# Patient Record
Sex: FEMALE | Race: BLACK OR AFRICAN AMERICAN | Employment: OTHER | URBAN - METROPOLITAN AREA
[De-identification: names, ages, dates, MRNs, and addresses within clinical notes are randomized per-mention and may not be internally consistent; named-entity substitution may affect disease eponyms.]

---

## 2019-10-08 ENCOUNTER — OFFICE VISIT (OUTPATIENT)
Dept: FAMILY MEDICINE CLINIC | Age: 64
End: 2019-10-08

## 2019-10-08 VITALS
HEIGHT: 61 IN | OXYGEN SATURATION: 94 % | HEART RATE: 83 BPM | TEMPERATURE: 96.8 F | WEIGHT: 98.6 LBS | RESPIRATION RATE: 16 BRPM | BODY MASS INDEX: 18.61 KG/M2 | SYSTOLIC BLOOD PRESSURE: 130 MMHG | DIASTOLIC BLOOD PRESSURE: 90 MMHG

## 2019-10-08 DIAGNOSIS — Z78.0 POST-MENOPAUSAL: ICD-10-CM

## 2019-10-08 DIAGNOSIS — F03.91 DEMENTIA WITH BEHAVIORAL DISTURBANCE, UNSPECIFIED DEMENTIA TYPE: ICD-10-CM

## 2019-10-08 DIAGNOSIS — Z12.39 SCREENING FOR BREAST CANCER: ICD-10-CM

## 2019-10-08 DIAGNOSIS — Z12.11 SCREENING FOR COLON CANCER: ICD-10-CM

## 2019-10-08 DIAGNOSIS — R03.0 ELEVATED BLOOD PRESSURE READING: Primary | ICD-10-CM

## 2019-10-08 DIAGNOSIS — Z78.9 DECREASED ACTIVITIES OF DAILY LIVING (ADL): ICD-10-CM

## 2019-10-08 DIAGNOSIS — G47.9 SLEEP TROUBLE: ICD-10-CM

## 2019-10-08 DIAGNOSIS — R32 URINARY INCONTINENCE, UNSPECIFIED TYPE: ICD-10-CM

## 2019-10-08 DIAGNOSIS — R63.0 POOR APPETITE: ICD-10-CM

## 2019-10-08 DIAGNOSIS — R26.89 BALANCE PROBLEM: ICD-10-CM

## 2019-10-08 DIAGNOSIS — Z11.59 NEED FOR HEPATITIS C SCREENING TEST: ICD-10-CM

## 2019-10-08 RX ORDER — HALOPERIDOL 0.5 MG/1
0.5 TABLET ORAL
Qty: 30 TAB | Refills: 1 | Status: SHIPPED | OUTPATIENT
Start: 2019-10-08 | End: 2019-12-16 | Stop reason: SDUPTHER

## 2019-10-08 NOTE — PROGRESS NOTES
1. Have you been to the ER, urgent care clinic since your last visit? Hospitalized since your last visit? No    2. Have you seen or consulted any other health care providers outside of the [address] [place] [address] since your last visit? Include any pap smears or colon screening. [facility]  LOV: 6/2018     Patient's niece states patient has dementia due to syphillis. Patient has not had a mammogram.  Patient refused flu vaccine.

## 2019-10-08 NOTE — PROGRESS NOTES
HISTORY OF PRESENT ILLNESS  Day Wade is a 75 y.o. female. HPI: new patient. Here with her niece. Said she has been not to doctor for long time as she did not want to. 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 Cognitive impairment. Her nephew has power of . He was not present today but per niece he will be here from next visit. Pt is not a good historian. Per niece needs help with ADL. Said unsteady gait. Fall risk. Also noted some behavior changes by nephew as hallucination. Not able to sleep for few nights straight. Sudden cry. Currently not on any medication. Not sure what exactly done in the past but all work up done years ago , almost 20 years ago at [facility] and since then has not seen any specialist seems like. No records noted in care everywhere. Will try to get a record release. Not following any specialist at this time. Not had any preventive work up done. Visit Vitals  /90 (BP 1 Location: Left arm, BP Patient Position: Sitting)   Pulse 83   Temp 96.8 °F (36 °C) (Temporal)   Resp 16   Ht 5' 0.5\" (1.537 m)   Wt 98 lb 9.6 oz (44.7 kg)   SpO2 94%   BMI 18.94 kg/m²     Review medication list, vitals, problem list,allergies. Agree to get mammogram and dexa scan. Agree to get done FIT test.   Refused all vaccination today . Per niece , next visit her brother will be here and he can give permission. Per niece bowel and urine incontinence. Denies any chest pain or breathing discomfort. No cold or cough. No leg swelling. No complains of abdominal discomfort. Poor appetite. ROS: pt has cognitive impairment from prior syphilis infection. Not able to get ROS. Physical Exam   Constitutional: No distress. Neck: No thyromegaly present. Cardiovascular: Normal heart sounds. Pulmonary/Chest: No respiratory distress. She has no wheezes. Abdominal: Soft. There is no tenderness. Musculoskeletal: She exhibits no edema.    Lymphadenopathy:     She has no cervical adenopathy. Neurological: She is alert. Psychiatric:   Sitting on a chair. No emotions. Looks comfortable while sitting. Clean clothes. Hair done. ASSESSMENT and PLAN    ICD-10-CM ICD-9-CM    1. Elevated blood pressure reading: low salt diet. Will observe for now. R03.0 796.2    2. Poor appetite: checking labs. R63.0 783.0 CBC W/O DIFF      METABOLIC PANEL, COMPREHENSIVE      TSH 3RD GENERATION   3. Screening for breast cancer Z12.39 V76.10 GARRICK MAMMO BI SCREENING INCL CAD   4. Post-menopausal:  Z78.0 V49.81 DEXA BONE DENSITY STUDY AXIAL   5. Screening for colon cancer Z12.11 V76.51 OCCULT BLOOD IMMUNOASSAY,DIAGNOSTIC   6. Need for hepatitis C screening test Z11.59 V73.89 HEPATITIS C AB   7. Sleep trouble: with behavior changes. Given haloperidol and also sent to specialist for further info. G47.9 780.50 haloperidol (HALDOL) 0.5 mg tablet      REFERRAL TO NEUROLOGY   8. Urinary incontinence, unspecified type: could be from cognitive dysfunction. Will observe. R32 788.30    9. Decreased activities of daily living (ADL): done home health. As poor gait . PT/ OT eval.  Z78.9 V49.89 REFERRAL TO HOME HEALTH      REFERRAL TO NEUROLOGY   10. Balance problem R26.89 781.99 REFERRAL TO [address] [place] [address]   11. Dementia with behavioral disturbance, unspecified dementia type [facility]): sent to neurology. Not had any time evaluation done. F03.91 294.21 [place]   Pt understood and agree with the plan   Review    Follow-up and Dispositions    · Return in about 1 month (around 11/8/2019).

## 2019-10-09 ENCOUNTER — HOME HEALTH ADMISSION (OUTPATIENT)
Dept: HOME HEALTH SERVICES | Facility: HOME HEALTH | Age: 64
End: 2019-10-09
Payer: MEDICAID

## 2019-10-10 ENCOUNTER — HOME CARE VISIT (OUTPATIENT)
Dept: HOME HEALTH SERVICES | Facility: HOME HEALTH | Age: 64
End: 2019-10-10

## 2019-10-14 ENCOUNTER — HOME CARE VISIT (OUTPATIENT)
Dept: SCHEDULING | Facility: HOME HEALTH | Age: 64
End: 2019-10-14
Payer: MEDICAID

## 2019-10-14 ENCOUNTER — HOME CARE VISIT (OUTPATIENT)
Dept: HOME HEALTH SERVICES | Facility: HOME HEALTH | Age: 64
End: 2019-10-14
Payer: MEDICAID

## 2019-10-14 ENCOUNTER — HOME CARE VISIT (OUTPATIENT)
Dept: HOME HEALTH SERVICES | Facility: HOME HEALTH | Age: 64
End: 2019-10-14

## 2019-10-14 VITALS
SYSTOLIC BLOOD PRESSURE: 140 MMHG | HEART RATE: 70 BPM | OXYGEN SATURATION: 97 % | DIASTOLIC BLOOD PRESSURE: 86 MMHG | RESPIRATION RATE: 16 BRPM | TEMPERATURE: 97.3 F

## 2019-10-14 PROCEDURE — G0151 HHCP-SERV OF PT,EA 15 MIN: HCPCS

## 2019-10-14 PROCEDURE — 400013 HH SOC

## 2019-10-15 ENCOUNTER — HOME CARE VISIT (OUTPATIENT)
Dept: HOME HEALTH SERVICES | Facility: HOME HEALTH | Age: 64
End: 2019-10-15
Payer: MEDICAID

## 2019-10-17 ENCOUNTER — HOME CARE VISIT (OUTPATIENT)
Dept: HOME HEALTH SERVICES | Facility: HOME HEALTH | Age: 64
End: 2019-10-17
Payer: MEDICAID

## 2019-10-17 ENCOUNTER — HOME CARE VISIT (OUTPATIENT)
Dept: SCHEDULING | Facility: HOME HEALTH | Age: 64
End: 2019-10-17
Payer: MEDICAID

## 2019-10-18 ENCOUNTER — HOME CARE VISIT (OUTPATIENT)
Dept: HOME HEALTH SERVICES | Facility: HOME HEALTH | Age: 64
End: 2019-10-18
Payer: MEDICAID

## 2019-10-18 PROCEDURE — G0152 HHCP-SERV OF OT,EA 15 MIN: HCPCS

## 2019-10-23 ENCOUNTER — HOME CARE VISIT (OUTPATIENT)
Dept: SCHEDULING | Facility: HOME HEALTH | Age: 64
End: 2019-10-23
Payer: MEDICAID

## 2019-10-23 ENCOUNTER — TELEPHONE (OUTPATIENT)
Dept: FAMILY MEDICINE CLINIC | Age: 64
End: 2019-10-23

## 2019-10-23 VITALS
TEMPERATURE: 97.3 F | HEART RATE: 71 BPM | OXYGEN SATURATION: 95 % | DIASTOLIC BLOOD PRESSURE: 96 MMHG | SYSTOLIC BLOOD PRESSURE: 136 MMHG

## 2019-10-23 PROCEDURE — G0157 HHC PT ASSISTANT EA 15: HCPCS

## 2019-10-24 ENCOUNTER — HOME CARE VISIT (OUTPATIENT)
Dept: HOME HEALTH SERVICES | Facility: HOME HEALTH | Age: 64
End: 2019-10-24
Payer: MEDICAID

## 2019-10-25 ENCOUNTER — HOME CARE VISIT (OUTPATIENT)
Dept: SCHEDULING | Facility: HOME HEALTH | Age: 64
End: 2019-10-25
Payer: MEDICAID

## 2019-10-28 ENCOUNTER — HOME CARE VISIT (OUTPATIENT)
Dept: SCHEDULING | Facility: HOME HEALTH | Age: 64
End: 2019-10-28
Payer: MEDICAID

## 2019-10-28 VITALS
OXYGEN SATURATION: 95 % | DIASTOLIC BLOOD PRESSURE: 85 MMHG | SYSTOLIC BLOOD PRESSURE: 132 MMHG | TEMPERATURE: 97.9 F | HEART RATE: 69 BPM

## 2019-10-28 PROCEDURE — G0157 HHC PT ASSISTANT EA 15: HCPCS

## 2019-11-01 ENCOUNTER — HOME CARE VISIT (OUTPATIENT)
Dept: SCHEDULING | Facility: HOME HEALTH | Age: 64
End: 2019-11-01
Payer: MEDICAID

## 2019-11-04 ENCOUNTER — HOME CARE VISIT (OUTPATIENT)
Dept: SCHEDULING | Facility: HOME HEALTH | Age: 64
End: 2019-11-04
Payer: MEDICAID

## 2019-11-04 PROCEDURE — G0157 HHC PT ASSISTANT EA 15: HCPCS

## 2019-11-05 VITALS
SYSTOLIC BLOOD PRESSURE: 143 MMHG | TEMPERATURE: 97.3 F | DIASTOLIC BLOOD PRESSURE: 94 MMHG | HEART RATE: 67 BPM | OXYGEN SATURATION: 97 %

## 2019-11-07 ENCOUNTER — HOME CARE VISIT (OUTPATIENT)
Dept: HOME HEALTH SERVICES | Facility: HOME HEALTH | Age: 64
End: 2019-11-07
Payer: MEDICAID

## 2019-11-09 ENCOUNTER — HOME CARE VISIT (OUTPATIENT)
Dept: SCHEDULING | Facility: HOME HEALTH | Age: 64
End: 2019-11-09
Payer: MEDICAID

## 2019-11-09 VITALS
TEMPERATURE: 96.9 F | OXYGEN SATURATION: 96 % | RESPIRATION RATE: 16 BRPM | DIASTOLIC BLOOD PRESSURE: 70 MMHG | SYSTOLIC BLOOD PRESSURE: 110 MMHG | HEART RATE: 69 BPM

## 2019-11-09 PROCEDURE — G0151 HHCP-SERV OF PT,EA 15 MIN: HCPCS

## 2019-11-19 ENCOUNTER — TELEPHONE (OUTPATIENT)
Dept: FAMILY MEDICINE CLINIC | Age: 64
End: 2019-11-19

## 2019-11-19 NOTE — TELEPHONE ENCOUNTER
Pt called to follow up on incontinence supplies request. Please call pt at your earliest convenience.

## 2019-11-21 NOTE — TELEPHONE ENCOUNTER
Pt called to follow up on incontinence supplies. They would like for you to call  [phone]. Please respond urgently pt is completely out.

## 2019-11-27 ENCOUNTER — TELEPHONE (OUTPATIENT)
Dept: FAMILY MEDICINE CLINIC | Age: 64
End: 2019-11-27

## 2019-11-27 NOTE — TELEPHONE ENCOUNTER
Pt's daughter states that pt was supposed to have a referral for the Infectious Disease and I didn't see one put in the system. She states that it is for the dementia due to syphilis infection. . pt is advised that DR Rosana Dwyer had not placed the order and that she is out of the office till Monday and will get the referral done then. Please advise.

## 2019-12-02 ENCOUNTER — TELEPHONE (OUTPATIENT)
Dept: FAMILY MEDICINE CLINIC | Age: 64
End: 2019-12-02

## 2019-12-02 DIAGNOSIS — Z86.19 H/O SYPHILIS: Primary | ICD-10-CM

## 2019-12-02 DIAGNOSIS — F09 COGNITIVE DYSFUNCTION: ICD-10-CM

## 2019-12-02 NOTE — TELEPHONE ENCOUNTER
Pt's niece states that pt had labs done at [facility] on 11/27/2019 and it showed that pt has Hyper thyroidism and would like to know if pt needs to be on medication. Advised that Dr Anish Stanton has not received the lab results yet but will call and advise when we get them. Please advise.

## 2019-12-06 ENCOUNTER — TELEPHONE (OUTPATIENT)
Dept: FAMILY MEDICINE CLINIC | Age: 64
End: 2019-12-06

## 2019-12-06 DIAGNOSIS — R79.89 ELEVATED TSH: Primary | ICD-10-CM

## 2019-12-06 RX ORDER — LEVOTHYROXINE SODIUM 50 UG/1
50 TABLET ORAL
Qty: 30 TAB | Refills: 1 | Status: SHIPPED | OUTPATIENT
Start: 2019-12-06 | End: 2019-12-16 | Stop reason: SDUPTHER

## 2019-12-06 NOTE — TELEPHONE ENCOUNTER
Starting synthroid. Repeat labs in 6 wks and also provide f/u appt in 6 wks and labs before an appt. Please see if she has been seen by neurology. I see that she has seen ID.    Thanks

## 2019-12-06 NOTE — TELEPHONE ENCOUNTER
Spoke with Amber Ryan (HIPAA verified-patient identifiers verified) to advise [facility] Infectious Disease needed patient to complete labs and forward neurology notes to their office in order complete appointment request. Kandi Padgett verbalized understanding and stated she will contact [facility] to request notes be sent to ID office. She was given the address, phone and fax number to [facility] ([address] [place] [address], [facility]; [place]: [phone] Fax: [phone]). Kandi Padgett was told I was unable to contact Mr. Yuliana Peña; his phone said it was unable to take call at this time. She requested lab orders from Dr. Mikey Daly be mailed to patient's address. Kandi Padgett was reminded patient has a follow-up appointment with Dr. Mikey Daly on 12/16/19 at 10:15A. She asked if Dr. Mikey Daly received thyroid lab results from [facility] for patient. I advised Dr. Mikey Daly did receive lab results and they are under review by physician; will discuss at follow-up visit. Kandi Padgett verbalized understanding.

## 2019-12-12 NOTE — TELEPHONE ENCOUNTER
Called and spoke with Gogo Dukes (on PHI) and she verbalized understanding of results reviewed and need to start medication. This was sent to pharmacy and also to recheck labs in 6 weeks. Patient has 12/16 appointment. Lab order to be given at that appointment.

## 2019-12-16 ENCOUNTER — OFFICE VISIT (OUTPATIENT)
Dept: FAMILY MEDICINE CLINIC | Age: 64
End: 2019-12-16

## 2019-12-16 VITALS
BODY MASS INDEX: 18.99 KG/M2 | OXYGEN SATURATION: 98 % | SYSTOLIC BLOOD PRESSURE: 136 MMHG | WEIGHT: 100.6 LBS | TEMPERATURE: 97.5 F | RESPIRATION RATE: 16 BRPM | DIASTOLIC BLOOD PRESSURE: 84 MMHG | HEART RATE: 82 BPM | HEIGHT: 61 IN

## 2019-12-16 DIAGNOSIS — Z86.19 H/O SYPHILIS: ICD-10-CM

## 2019-12-16 DIAGNOSIS — F03.91 DEMENTIA WITH BEHAVIORAL DISTURBANCE, UNSPECIFIED DEMENTIA TYPE: ICD-10-CM

## 2019-12-16 DIAGNOSIS — G47.9 SLEEP TROUBLE: Primary | ICD-10-CM

## 2019-12-16 DIAGNOSIS — Z78.9 DECREASED ACTIVITIES OF DAILY LIVING (ADL): ICD-10-CM

## 2019-12-16 DIAGNOSIS — H21.562 PUPILLARY ABNORMALITY OF LEFT EYE: ICD-10-CM

## 2019-12-16 DIAGNOSIS — E03.9 HYPOTHYROIDISM, UNSPECIFIED TYPE: ICD-10-CM

## 2019-12-16 RX ORDER — HALOPERIDOL 0.5 MG/1
0.5 TABLET ORAL
Qty: 30 TAB | Refills: 1 | Status: SHIPPED | OUTPATIENT
Start: 2019-12-16 | End: 2020-03-27 | Stop reason: SDUPTHER

## 2019-12-16 RX ORDER — LEVOTHYROXINE SODIUM 50 UG/1
50 TABLET ORAL
Qty: 30 TAB | Refills: 1 | Status: SHIPPED | OUTPATIENT
Start: 2019-12-16 | End: 2020-01-15

## 2019-12-16 NOTE — PATIENT INSTRUCTIONS
Dementia: Care Instructions  Your Care Instructions    Dementia is a loss of mental skills that affects your daily life. It is different than the occasional trouble with memory that is part of aging. You may find it hard to remember things that you feel you should be able to remember. Or you may feel that your mind is just not working as well as usual.  Finding out that you have dementia is a shock. You may be afraid and worried about how the condition will change your life. Although there is no cure at this time, medicine may slow memory loss and improve thinking for a while. Other medicines may be able to help you sleep or cope with depression and behavior changes. Dementia often gets worse slowly. But it can get worse quickly. As dementia gets worse, it may become harder to do common things that take planning, like making a list and going shopping. Over time, the disease may make it hard for you to take care of yourself. Some people with dementia need others to help care for them. Dementia is different for everyone. You may be able to function well for a long time. In the early stage of the condition, you can do things at home to make life easier and safer. You also can keep doing your hobbies and other activities. Many people find comfort in planning now for their future needs. Follow-up care is a key part of your treatment and safety. Be sure to make and go to all appointments, and call your doctor if you are having problems. It's also a good idea to know your test results and keep a list of the medicines you take. How can you care for yourself at home? · Take your medicines exactly as prescribed. Call your doctor if you think you are having a problem with your medicine. · Eat healthy foods. Eat lots of whole grains, fruits, and vegetables every day.  If you are not hungry, try snacks or nutritional drinks such as Boost, Ensure, or Sustacal.  · If you have problems sleeping:  ? Try not to nap too close to your bedtime. ? Exercise regularly. Walking is a good choice. ? Try a glass of warm milk or caffeine-free herbal tea before bed. · Do tasks and activities during the time of day when you feel your best. It may help to develop a daily routine. · Post labels, lists, and sticky notes to help you remember things. Write your activities on a calendar you can easily find. Put your clock where you can easily see it. · Stay active. Take walks in familiar places, or with friends or loved ones. Try to stay active mentally too. Read and work crossword puzzles if you enjoy these activities. · Do not drive unless you can pass an on-road driving test. If you are not sure if you are safe to drive, your state 's license bureau can test you. · Keep a cordless phone and a flashlight with new batteries by your bed. If possible, put a phone in each of the main rooms of your house, or carry a cell phone in case you fall and cannot reach a phone. Or, you can wear a device around your neck or wrist. You push a button that sends a signal for help. Acknowledge your emotions and plan for the future  · Talk openly and honestly with your doctor. · Let yourself grieve. It is common to feel angry, scared, frustrated, anxious, or depressed. · Get emotional support from family, friends, a support group, or a counselor experienced in working with people who have dementia. · Ask for help if you need it. · Tell your doctor how you feel. You may feel upset, angry, or worried at times. Many things can cause this, including poor sleep, medicine side effects, confusion, and pain. Your doctor may be able to help you. · Plan for the future. ? Talk to your family and doctor about preparing a living will and other important papers while you can make decisions. These papers tell your doctors how to care for you at the end of your life. ? Consider naming a person to make decisions about your care if you are not able to.   When should you call for help? Call 911 anytime you think you may need emergency care. For example, call if:    · You are lost and do not know whom to call.     · You are injured and do not know whom to call.    Call your doctor now or seek immediate medical care if:    · You are more confused or upset than usual.     · You feel like you could hurt yourself because your mind is not working well.   [facility] closely for changes in your health, and be sure to contact your doctor if you have any problems. Where can you learn more? Go to http://humaPPSsamreen.info/. Enter X590 in the search box to learn more about \"Dementia: Care Instructions. \"  Current as of: May 28, 2019  Content Version: 12.2  © 5771-8648 [employer]. Care instructions adapted under license by [employer] (which disclaims liability or warranty for this information). If you have questions about a medical condition or this instruction, always ask your healthcare professional. [place] [address] any warranty or liability for your use of this information. Preventing Falls: Care Instructions  Your Care Instructions    Getting around your home safely can be a challenge if you have injuries or health problems that make it easy for you to fall. Loose rugs and furniture in walkways are among the dangers for many older people who have problems walking or who have poor eyesight. People who have conditions such as arthritis, osteoporosis, or dementia also have to be careful not to fall. You can make your home safer with a few simple measures. Follow-up care is a key part of your treatment and safety. Be sure to make and go to all appointments, and call your doctor if you are having problems. It's also a good idea to know your test results and keep a list of the medicines you take. How can you care for yourself at home? Taking care of yourself  · You may get dizzy if you do not drink enough water. To prevent dehydration, drink plenty of fluids, enough so that your urine is light yellow or clear like water. Choose water and other caffeine-free clear liquids. If you have kidney, heart, or liver disease and have to limit fluids, talk with your doctor before you increase the amount of fluids you drink. · Exercise regularly to improve your strength, muscle tone, and balance. Walk if you can. Swimming may be a good choice if you cannot walk easily. · Have your vision and hearing checked each year or any time you notice a change. If you have trouble seeing and hearing, you might not be able to avoid objects and could lose your balance. · Know the side effects of the medicines you take. Ask your doctor or pharmacist whether the medicines you take can affect your balance. Sleeping pills or sedatives can affect your balance. · Limit the amount of alcohol you drink. Alcohol can impair your balance and other senses. · Ask your doctor whether calluses or corns on your feet need to be removed. If you wear loose-fitting shoes because of calluses or corns, you can lose your balance and fall. · Talk to your doctor if you have numbness in your feet. Preventing falls at home  · Remove raised doorway thresholds, throw rugs, and clutter. Repair loose carpet or raised areas in the floor. · Move furniture and electrical cords to keep them out of walking paths. · Use nonskid floor wax, and wipe up spills right away, especially on ceramic tile floors. · If you use a walker or cane, put rubber tips on it. If you use crutches, clean the bottoms of them regularly with an abrasive pad, such as steel wool. · Keep your house well lit, especially [place], and outside walkways. Use night-lights in areas such as hallways and bathrooms.  Add extra light switches or use remote switches (such as switches that go on or off when you clap your hands) to make it easier to turn lights on if you have to get up during the night.  · Install sturdy handrails on stairways. · Move items in your cabinets so that the things you use a lot are on the lower shelves (about waist level). · Keep a cordless phone and a flashlight with new batteries by your bed. If possible, put a phone in each of the main rooms of your house, or carry a cell phone in case you fall and cannot reach a phone. Or, you can wear a device around your neck or wrist. You push a button that sends a signal for help. · Wear low-heeled shoes that fit well and give your feet good support. Use footwear with nonskid soles. Check the heels and soles of your shoes for wear. Repair or replace worn heels or soles. · Do not wear socks without shoes on wood floors. · Walk on the grass when the sidewalks are slippery. If you live in an area that gets snow and ice in the winter, sprinkle salt on slippery steps and sidewalks. Preventing falls in the bath  · Install grab bars and nonskid mats inside and outside your shower or tub and near the toilet and sinks. · Use shower chairs and bath benches. · Use a hand-held shower head that will allow you to sit while showering. · Get into a tub or shower by putting the weaker leg in first. Get out of a tub or shower with your strong side first.  · Repair loose toilet seats and consider installing a raised toilet seat to make getting on and off the toilet easier. · Keep your bathroom door unlocked while you are in the shower. Where can you learn more? Go to http://huma-samreen.info/. Enter 0476 79 69 [id number] in the search box to learn more about \"Preventing Falls: Care Instructions. \"  Current as of: November 7, 2018  Content Version: 12.2  © 8443-5899 [employer]. Care instructions adapted under license by [employer] (which disclaims liability or warranty for this information).  If you have questions about a medical condition or this instruction, always ask your healthcare professional. [employer] disclaims any warranty or liability for your use of this information. Eating Healthy Foods: Care Instructions  Your Care Instructions    Eating healthy foods can help lower your risk for disease. Healthy food gives you energy and keeps your heart strong, your brain active, your muscles working, and your bones strong. A healthy diet includes a variety of foods from the basic food groups: grains, vegetables, fruits, milk and milk products, and meat and beans. Some people may eat more of their favorite foods from only one food group and, as a result, miss getting the nutrients they need. So, it is important to pay attention not only to what you eat but also to what you are missing from your diet. You can eat a healthy, balanced diet by making a few small changes. Follow-up care is a key part of your treatment and safety. Be sure to make and go to all appointments, and call your doctor if you are having problems. It's also a good idea to know your test results and keep a list of the medicines you take. How can you care for yourself at home? Look at what you eat  · Keep a food diary for a week or two and record everything you eat or drink. Track the number of servings you eat from each food group. · For a balanced diet every day, eat a variety of:  ? 6 or more ounce-equivalents of grains, such as cereals, breads, crackers, rice, or pasta, every day. An ounce-equivalent is 1 slice of bread, 1 cup of ready-to-eat cereal, or ½ cup of cooked rice, cooked pasta, or cooked cereal.  ? 2½ cups of vegetables, especially:  § Dark-green vegetables such as broccoli and spinach. § Orange vegetables such as carrots and sweet potatoes. § Dry beans (such as wilson and kidney beans) and peas (such as lentils). ? 2 cups of fresh, frozen, or canned fruit. A small apple or 1 banana or orange equals 1 cup. ? 3 cups of nonfat or low-fat milk, yogurt, or other milk products.   ? 5½ ounces of meat and beans, such as chicken, fish, lean meat, beans, nuts, and seeds. One egg, 1 tablespoon of peanut butter, ½ ounce nuts or seeds, or ¼ cup of cooked beans equals 1 ounce of meat. · Learn how to read food labels for serving sizes and ingredients. Fast-food and convenience-food meals often contain few or no fruits or vegetables. Make sure you eat some fruits and vegetables to make the meal more nutritious. · Look at your food diary. For each food group, add up what you have eaten and then divide the total by the number of days. This will give you an idea of how much you are eating from each food group. See if you can find some ways to change your diet to make it more healthy. Start small  · Do not try to make dramatic changes to your diet all at once. You might feel that you are missing out on your favorite foods and then be more likely to fail. · Start slowly, and gradually change your habits. Try some of the following:  ? Use whole wheat bread instead of white bread. ? Use nonfat or low-fat milk instead of whole milk. ? Eat brown rice instead of white rice, and eat whole wheat pasta instead of white-flour pasta. ? Try low-fat cheeses and low-fat yogurt. ? Add more fruits and vegetables to meals and have them for snacks. ? Add lettuce, tomato, cucumber, and onion to sandwiches. ? Add fruit to yogurt and cereal.  Enjoy food  · You can still eat your favorite foods. You just may need to eat less of them. If your favorite foods are high in fat, salt, and sugar, limit how often you eat them, but do not cut them out entirely. · Eat a wide variety of foods. Make healthy choices when eating out  · The type of restaurant you choose can help you make healthy choices. Even fast-food chains are now offering more low-fat or healthier choices on the menu. · Choose smaller portions, or take half of your meal home. · When eating out, try:  ?  A veggie pizza with a whole wheat crust or grilled chicken (instead of sausage or pepperoni). ? Pasta with roasted vegetables, grilled chicken, or marinara sauce instead of cream sauce. ? A vegetable wrap or grilled chicken wrap. ? Broiled or poached food instead of fried or breaded items. Make healthy choices easy  · Buy packaged, prewashed, ready-to-eat fresh vegetables and fruits, such as baby carrots, salad mixes, and chopped or shredded broccoli and cauliflower. · Buy packaged, presliced fruits, such as melon or pineapple. · Choose 100% fruit or vegetable juice instead of soda. Limit juice intake to 4 to 6 oz (½ to ¾ cup) a day. · Blend low-fat yogurt, fruit juice, and canned or frozen fruit to make a smoothie for breakfast or a snack. Where can you learn more? Go to http://huma-samreen.info/. Enter T756 in the search box to learn more about \"Eating Healthy Foods: Care Instructions. \"  Current as of: November 7, 2018  Content Version: 12.2  © 7727-0587 [employer]. Care instructions adapted under license by [employer] (which disclaims liability or warranty for this information). If you have questions about a medical condition or this instruction, always ask your healthcare professional. [address] any warranty or liability for your use of this information.

## 2019-12-16 NOTE — PROGRESS NOTES
1. Have you been to the ER, urgent care clinic since your last visit? Hospitalized since your last visit? No    2. Have you seen or consulted any other health care providers outside of the [address] [place] [address] since your last visit? Include any pap smears or colon screening. No    Chief Complaint   Patient presents with    Elevated Blood Pressure    Decreased Appetite    Sleep Problem    Dementia    Other     balance    Eye Problem     patient wants doctor to examine left eye - some irritation as per patient     Patient refused flu vaccine.

## 2019-12-17 ENCOUNTER — TELEPHONE (OUTPATIENT)
Dept: FAMILY MEDICINE CLINIC | Age: 64
End: 2019-12-17

## 2019-12-17 NOTE — PROGRESS NOTES
HISTORY OF PRESENT ILLNESS  Darian Vergara is a 75 y.o. female. HPI:Here with her care giver. 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 She was seen by ID at [facility] and labs been ordered. She has not done labs ordered by me last visit. Reprinted orders and advised her to do it. Also on haloperidol for behavior changes. For now following [facility] as labs from them noted elevated TSh and she was started on synthroid. Advised her to repeat thyroid function in couple of months. Mean time be complaint with taking medication. Also home health has released her for PT/ Ot. Discussed her care taker about fall prevention. Use support to walk and encouraged pt to do so. Noted on exam left eye pupil discoloration compare to rt eye. She is having vision changes as well. She has been following neurology. Will obtain their notes. If not mentioned will consider do addendum in her care with neurology  No ext weakness, has some incontinent for urine , could be part of her dementia. Visit Vitals  /84 (BP 1 Location: Left arm, BP Patient Position: Sitting)   Pulse 82   Temp 97.5 °F (36.4 °C) (Oral)   Resp 16   Ht 5' 0.5\" (1.537 m)   Wt 100 lb 9.6 oz (45.6 kg)   SpO2 98%   BMI 19.32 kg/m²     Review medication list, vitals, problem list,allergies. ROS: see HPI     Physical Exam  Constitutional:       General: She is not in acute distress. Eyes:      Comments: Left eye : grey color pupil   Rt eye : brownish black pupil  React to light equally    Cardiovascular:      Rate and Rhythm: Normal rate and regular rhythm. Pulses: Normal pulses. Heart sounds: Normal heart sounds. Pulmonary:      Effort: Pulmonary effort is normal.      Breath sounds: Normal breath sounds. Abdominal:      General: Bowel sounds are normal.      Palpations: Abdomen is soft. Tenderness: There is no tenderness. Musculoskeletal:         General: No swelling.    Neurological:      Mental Status: She is alert and oriented to person, place, and time. Psychiatric:         Behavior: Behavior normal.         ASSESSMENT and PLAN    ICD-10-CM ICD-9-CM    1. Sleep trouble; stable with halopredol. G47.9 780.50 haloperidol (HALDOL) 0.5 mg tablet   2. Dementia with behavioral disturbance, unspecified dementia type ([facility] F03.91 294.21    3. H/O syphilis: established care with ID.  Z86.19 V12.09     seen ID. had congnitive dysfunction from syphilis. 4. Hypothyroidism, unspecified type: started on synthroid. Now advised to do labs in 2 months. E03.9 244.9    5. Decreased activities of daily living (ADL) Z78.9 V49.89    6. Pupillary abnormality of left eye: will do addendum at neurology . H21.887 364.75     grey color . no vision changes    Pt understood and agree with the plan   Review    Follow-up and Dispositions    · Return in about 3 months (around 3/16/2020).

## 2019-12-17 NOTE — TELEPHONE ENCOUNTER
Gavi Butler called and stated the medication haloperidol (HALDOL) 0.5 mg tablet isn't working. Please call pt at your earliest convenience.

## 2019-12-18 ENCOUNTER — TELEPHONE (OUTPATIENT)
Dept: FAMILY MEDICINE CLINIC | Age: 64
End: 2019-12-18

## 2019-12-18 NOTE — TELEPHONE ENCOUNTER
Spoke with patient' granddaughter and she indicated that the patient is not sleeping. Patient is being followed by [facility]. I advised Ms. Antonio to contact their office for recommendations. She verbalized understanding and will call us back if needed.

## 2019-12-18 NOTE — TELEPHONE ENCOUNTER
Spoke again with patient's niece Ms. Alvarez and she indicated that she called over to the [facility] and her doctor is not in the office and would like this to be addressed by her PCP as we last Rxd this for her    Patient has not been sleeping and hasn't slept in approximately 4 days. Niece is worried.

## 2019-12-19 NOTE — TELEPHONE ENCOUNTER
She is on haloperidol and refill was given during last visit. Per anna it was given for sleep. There should be covering provider at Neurology office as she is very new and specialist office is managing her dementia. It can be probably due to dementia changes. Please talk to her neurology office and see if we can ask them to help her with their recommendations. If needed please speak with Dr. Eric Mosqueda and see if needed to be seen again? ?

## 2019-12-19 NOTE — TELEPHONE ENCOUNTER
Called and spoke with  Kandis Tam at [facility] and she indicated that also spoke with the patient's niece yesterday and she was Rx'd Remeron 7.5mg QHS yesterday.

## 2020-01-07 ENCOUNTER — TELEPHONE (OUTPATIENT)
Dept: FAMILY MEDICINE CLINIC | Age: 65
End: 2020-01-07

## 2020-01-07 NOTE — TELEPHONE ENCOUNTER
Pt's niece is requesting to get more hours for home care to help out. She states that the pt has left side weakness and the cataracts , dementia with  The behavioral disturbances. If an order can be sent in to the agency requesting more hours. It looks like the  Pt uses [facility]. Please advise.

## 2020-01-08 NOTE — TELEPHONE ENCOUNTER
Please let her know that it is up to insurance company to decide how many hours she gets and rest of the time they need to have a personal AID at their own cost. She can speak with her insurance  and see if they can provide more help?? Or another evaluation? ?

## 2020-01-08 NOTE — TELEPHONE ENCOUNTER
Patient's niece called again to give us the name of the home health agency that has been coming tot he home. I asked who has been signing home health orders because I do not see any signed orders in our chart. She states she thinks that her prior doctor was signing. I advised we would need to see orders from home health in order to know what is going on.

## 2020-01-09 NOTE — TELEPHONE ENCOUNTER
Returned call to [facility] (HIPAA verified), but no answer and unable to leave a voice message due to a full mailbox.

## 2020-01-10 LAB
ALBUMIN SERPL-MCNC: 3.7 G/DL (ref 3.6–4.8)
ALBUMIN/GLOB SERPL: 1.2 {RATIO} (ref 1.2–2.2)
ALP SERPL-CCNC: 100 IU/L (ref 39–117)
ALT SERPL-CCNC: 11 IU/L (ref 0–32)
AST SERPL-CCNC: 17 IU/L (ref 0–40)
BILIRUB SERPL-MCNC: <0.2 MG/DL (ref 0–1.2)
BUN SERPL-MCNC: 10 MG/DL (ref 8–27)
BUN/CREAT SERPL: 14 (ref 12–28)
CALCIUM SERPL-MCNC: 8.8 MG/DL (ref 8.7–10.3)
CHLORIDE SERPL-SCNC: 107 MMOL/L (ref 96–106)
CO2 SERPL-SCNC: 24 MMOL/L (ref 20–29)
CREAT SERPL-MCNC: 0.7 MG/DL (ref 0.57–1)
ERYTHROCYTE [DISTWIDTH] IN BLOOD BY AUTOMATED COUNT: 13.6 % (ref 11.7–15.4)
GLOBULIN SER CALC-MCNC: 3.2 G/DL (ref 1.5–4.5)
GLUCOSE SERPL-MCNC: 79 MG/DL (ref 65–99)
HCT VFR BLD AUTO: 40.1 % (ref 34–46.6)
HCV AB S/CO SERPL IA: <0.1 S/CO RATIO (ref 0–0.9)
HGB BLD-MCNC: 12.9 G/DL (ref 11.1–15.9)
MCH RBC QN AUTO: 27.8 PG (ref 26.6–33)
MCHC RBC AUTO-ENTMCNC: 32.2 G/DL (ref 31.5–35.7)
MCV RBC AUTO: 86 FL (ref 79–97)
PLATELET # BLD AUTO: 229 X10E3/UL (ref 150–450)
POTASSIUM SERPL-SCNC: 3.7 MMOL/L (ref 3.5–5.2)
PROT SERPL-MCNC: 6.9 G/DL (ref 6–8.5)
RBC # BLD AUTO: 4.64 X10E6/UL (ref 3.77–5.28)
SODIUM SERPL-SCNC: 146 MMOL/L (ref 134–144)
TSH SERPL DL<=0.005 MIU/L-ACNC: 13.94 UIU/ML (ref 0.45–4.5)
WBC # BLD AUTO: 5.2 X10E3/UL (ref 3.4–10.8)

## 2020-01-15 DIAGNOSIS — E03.9 HYPOTHYROIDISM, UNSPECIFIED TYPE: Primary | ICD-10-CM

## 2020-01-15 DIAGNOSIS — E87.0 HYPERNATREMIA: ICD-10-CM

## 2020-01-15 RX ORDER — LEVOTHYROXINE SODIUM 75 UG/1
75 TABLET ORAL
Qty: 30 TAB | Refills: 1 | Status: SHIPPED | OUTPATIENT
Start: 2020-01-15 | End: 2020-03-16 | Stop reason: SDUPTHER

## 2020-01-15 NOTE — PROGRESS NOTES
High sodium and high tsh. Will go up on the dose and recheck labs in 2 months. Also elevate sodium. Drink more fluid. Will recheck labs along with thyroid function.

## 2020-01-17 NOTE — TELEPHONE ENCOUNTER
Sarah Ponce (HIPAA verified-all identifiers verified) called to advise patient is receiving home health care services. Florencio López stated the patient is telling home health nurse that she can do daily activities on her own when she is not able to care for herself. She is concerned home health nurse does not realize patient has dementia. Florencio López did not know name of health care agency patient is using; stated her brother has the information. Florencio López was asked to have home health agency fax a record request to [facility], [phone]. We will send referral order and office notes to agency. She was asked to contact home health agency to advise them of her concerns, as well. Florencio López verbalized understanding. Rae Hutson was advised Medicaid determines amount of home health care hours and services patient can receive. If any additional home health hours are needed for patient's care, it will be an out-of-pocket expense. She was asked to contact patient's  to discuss care hours further. Florencio Douglasjack verbalized understanding.

## 2020-01-17 NOTE — TELEPHONE ENCOUNTER
Attempted to contact patient's niece, Cassie Garcia, on mobile phone but no answer and unable to leave a voice message due to a full voice mailbox.

## 2020-01-17 NOTE — PROGRESS NOTES
Tried to reach patient but received message that call cannot be completed as there are restrictions on this line.

## 2020-01-20 NOTE — PROGRESS NOTES
Tried to reach patient regarding results and received message that call cannot be completed as there are restrictions on this line. Will send GIT letter.

## 2020-01-21 ENCOUNTER — PATIENT OUTREACH (OUTPATIENT)
Dept: FAMILY MEDICINE CLINIC | Age: 65
End: 2020-01-21

## 2020-01-24 NOTE — TELEPHONE ENCOUNTER
Spoke with Queta Esparza and she indicated that her brother is taking care of the [address] [place] [address] Taz Machado. She is still receiving home care services but no respite care services as she is requesting. Advised if there is anything that they need assistance with to please give us a call.

## 2020-03-16 ENCOUNTER — OFFICE VISIT (OUTPATIENT)
Dept: FAMILY MEDICINE CLINIC | Age: 65
End: 2020-03-16

## 2020-03-16 VITALS
OXYGEN SATURATION: 95 % | DIASTOLIC BLOOD PRESSURE: 80 MMHG | BODY MASS INDEX: 18.96 KG/M2 | HEART RATE: 62 BPM | WEIGHT: 100.4 LBS | TEMPERATURE: 97.6 F | RESPIRATION RATE: 16 BRPM | HEIGHT: 61 IN | SYSTOLIC BLOOD PRESSURE: 110 MMHG

## 2020-03-16 DIAGNOSIS — R63.0 POOR APPETITE: ICD-10-CM

## 2020-03-16 DIAGNOSIS — G47.9 SLEEP TROUBLE: ICD-10-CM

## 2020-03-16 DIAGNOSIS — E03.9 HYPOTHYROIDISM, UNSPECIFIED TYPE: ICD-10-CM

## 2020-03-16 DIAGNOSIS — F03.91 DEMENTIA WITH BEHAVIORAL DISTURBANCE, UNSPECIFIED DEMENTIA TYPE: Primary | ICD-10-CM

## 2020-03-16 DIAGNOSIS — H21.562 PUPILLARY ABNORMALITY OF LEFT EYE: ICD-10-CM

## 2020-03-16 DIAGNOSIS — Z86.19 H/O SYPHILIS: ICD-10-CM

## 2020-03-16 DIAGNOSIS — Z78.9 DECREASED ACTIVITIES OF DAILY LIVING (ADL): ICD-10-CM

## 2020-03-16 RX ORDER — LEVOTHYROXINE SODIUM 75 UG/1
75 TABLET ORAL
Qty: 30 TAB | Refills: 1 | Status: SHIPPED | OUTPATIENT
Start: 2020-03-16 | End: 2022-09-28 | Stop reason: ALTCHOICE

## 2020-03-16 RX ORDER — PREDNISOLONE ACETATE 10 MG/ML
SUSPENSION/ DROPS OPHTHALMIC
COMMUNITY
Start: 2020-02-23 | End: 2021-05-21

## 2020-03-16 RX ORDER — BISMUTH SUBSALICYLATE 262 MG
1 TABLET,CHEWABLE ORAL DAILY
Qty: 90 TAB | Refills: 0 | Status: SHIPPED | OUTPATIENT
Start: 2020-03-16 | End: 2022-09-28 | Stop reason: SDUPTHER

## 2020-03-16 RX ORDER — TRAZODONE HYDROCHLORIDE 50 MG/1
50 TABLET ORAL
COMMUNITY
End: 2021-05-21

## 2020-03-16 RX ORDER — HALOPERIDOL 0.5 MG/1
0.5 TABLET ORAL
Qty: 30 TAB | Refills: 1 | Status: CANCELLED | OUTPATIENT
Start: 2020-03-16

## 2020-03-16 NOTE — PROGRESS NOTES
1. Have you been to the ER, urgent care clinic since your last visit? Hospitalized since your last visit? No    2. Have you seen or consulted any other health care providers outside of the [address] [place] [address] since your last visit? Include any pap smears or colon screening. [facility] Dr. Travis [facility]) for cataract removal left eye LOV: 2/2020. Chief Complaint   Patient presents with    Sleep Problem    Dementia    Thyroid Problem    Other     decreased ADLs    Eye Problem     pupillary abnormality of left eye    Decreased Appetite     off/on x 5 months - requests Rx to help with decreased appetite.         Mammogram - not completed

## 2020-03-16 NOTE — PROGRESS NOTES
HISTORY OF PRESENT ILLNESS  Izaiah Lockhart is a 75 y.o. female. HPI: here for follow up. Her niece was accompanied with her. H/o syphilis and dementia related to it. Following neurology and ID at [facility]. Will be obtaining their notes and advised to follow their recommendations. She had MRI head and CSF study done. Per niece recommended treatment with IV antibiotic. Advised to follow their instructions and contact their office to have f/u on set up for antibiotic. H/o elevated TSH. She was started on synthroid. For now advised to repeat labs. She has poor appetite. Could be from dementia and could be from hypothyroidism as well. No weight changes. For now advised multivitamin daily. encourage her to drink more fluid and healthy diet. She is taking haldol which is helping her for behavior. Still struggle with sleep. Sitting comfortable on a wheel chair. Not in an acute distress. No other  Concern with care taker or pt. No chest pain or sob. No palpitation. No urinary or bowel complains. Visit Vitals  /80 (BP 1 Location: Left arm, BP Patient Position: Sitting)   Pulse 62   Temp 97.6 °F (36.4 °C) (Axillary)   Resp 16   Ht 5' 0.5\" (1.537 m)   Wt 100 lb 6.4 oz (45.5 kg)   SpO2 95%   BMI 19.29 kg/m²     Review medication list, vitals, problem list,allergies. ROS: see HPI     Physical Exam  Cardiovascular:      Rate and Rhythm: Normal rate and regular rhythm. Pulmonary:      Effort: Pulmonary effort is normal. No respiratory distress. Abdominal:      Palpations: Abdomen is soft. Tenderness: There is no abdominal tenderness. Musculoskeletal:         General: No swelling. Neurological:      Mental Status: She is alert. Psychiatric:         Behavior: Behavior normal.         ASSESSMENT and PLAN    ICD-10-CM ICD-9-CM    1. Dementia with behavioral disturbance, unspecified dementia type ([facility]): due to syphilis. Following neurology and ID> plan for IV antibiotic. F03.91 294.21    2. Sleep trouble: following neurology. Trazodone helping very little. Will observe and follow specialist recommendations. G47.9 780.50     started on trazodone     3. Hypothyroidism, unspecified type: complaint with medication. Repeat labs. Appetite loss could be multiple reason. For now multivitamin. E03.9 244.9 levothyroxine (SYNTHROID) 75 mcg tablet   4. H/O syphilis: following ID. See above  Z86.19 V12.09    5. Pupillary abnormality of left eye: no neurology notes available. For now will obtain notes and follow neurology recommendations. H21.562 364.75    6. Decreased activities of daily living (ADL): home health helping. Z78.9 V49.89    7. Poor appetite: see above/ HPI  R63.0 783.0 multivitamin (ONE A DAY) tablet   Pt understood and agree with the plan   Review    Follow-up and Dispositions    · Return in about 2 months (around 5/16/2020).

## 2020-03-16 NOTE — PATIENT INSTRUCTIONS
Syphilis: Care Instructions  Your Care Instructions  Syphilis is an infection caused by bacteria. It's usually spread through sex. It is one of several types of sexually transmitted infections (STIs). The first symptom is usually a painless, red sore on the genitals, rectal area, or mouth. This type of sore is called a chancre (say \"SHANK-er\"). Later, you may get other symptoms. These include a rash, a fever, and swollen lymph nodes. Your hair may start to fall out. Or you may feel like you have the flu. Sometimes these symptoms go away on their own. But this doesn't mean that the infection is gone. If you don't treat syphilis with antibiotics, the infection can spread in your body. You can also spread it to others. Antibiotics can cure syphilis and prevent more serious complications. Both you and your sex partner or partners need antibiotic treatment. This is to prevent you from passing the infection back and forth or to other sex partners. During the first 24 hours of treatment, you may have a fever, a headache, and muscle aches. After treatment, you will get blood tests to make sure you don't have any more bacteria in your body. You may also want to be tested for other STIs. Follow-up care is a key part of your treatment and safety. Be sure to make and go to all appointments, and call your doctor if you are having problems. It's also a good idea to know your test results and keep a list of the medicines you take. How can you care for yourself at home? · Your doctor probably gave you a shot of antibiotics. If you've had syphilis for a while, you may need 2 more shots. It's very important to get all the recommended shots. · If your doctor prescribed antibiotic pills, take them as directed. Do not stop taking them just because you feel better. You need to take the full course of antibiotics. · Do not have sexual contact with anyone while you are being treated.  After treatment, wait at least 7 days and until all of your sores are healed before you have any sexual contact. Even if you use a condom, you and your partner may pass the infection back and forth. · Wash your hands if you touch an infected area. This will help prevent spreading the infection to other parts of your body or to other people. · Tell your sex partner or partners that you have syphilis. They should get treatment even if they don't have symptoms. To prevent syphilis in the future  · Use latex condoms every time you have sex. Use them from the start to the end of sexual contact. · Talk to your partner before you have sex. Find out if he or she has or is at risk for syphilis or any other STI. Remember that a person without symptoms may still be able to spread an STI. · Do not have sex or any type of sexual contact if you are being treated for syphilis or any other STI. · Do not have sex with anyone who has symptoms of an STI. These include sores on the genitals or mouth. · Having one sex partner (who does not have STIs and does not have sex with anyone else) is a good way to avoid STIs. When should you call for help? Watch closely for changes in your health, and be sure to contact your doctor if:    · You do not get better as expected.     · Your symptoms continue or come back after treatment.     · You develop new symptoms, such as a fever. Where can you learn more? Go to http://huma-samreen.info/  Enter Z000 in the search box to learn more about \"Syphilis: Care Instructions. \"  Current as of: July 7, 2019Content Version: 12.4  © 8718-6372 Healthwise, Incorporated. Care instructions adapted under license by [employer] (which disclaims liability or warranty for this information). If you have questions about a medical condition or this instruction, always ask your healthcare professional. [address] any warranty or liability for your use of this information.          Dementia: Care Instructions  Your Care Instructions    Dementia is a loss of mental skills that affects your daily life. It is different than the occasional trouble with memory that is part of aging. You may find it hard to remember things that you feel you should be able to remember. Or you may feel that your mind is just not working as well as usual.  Finding out that you have dementia is a shock. You may be afraid and worried about how the condition will change your life. Although there is no cure at this time, medicine may slow memory loss and improve thinking for a while. Other medicines may be able to help you sleep or cope with depression and behavior changes. Dementia often gets worse slowly. But it can get worse quickly. As dementia gets worse, it may become harder to do common things that take planning, like making a list and going shopping. Over time, the disease may make it hard for you to take care of yourself. Some people with dementia need others to help care for them. Dementia is different for everyone. You may be able to function well for a long time. In the early stage of the condition, you can do things at home to make life easier and safer. You also can keep doing your hobbies and other activities. Many people find comfort in planning now for their future needs. Follow-up care is a key part of your treatment and safety. Be sure to make and go to all appointments, and call your doctor if you are having problems. It's also a good idea to know your test results and keep a list of the medicines you take. How can you care for yourself at home? · Take your medicines exactly as prescribed. Call your doctor if you think you are having a problem with your medicine. · Eat healthy foods. Eat lots of whole grains, fruits, and vegetables every day.  If you are not hungry, try snacks or nutritional drinks such as Boost, Ensure, or Sustacal.  · If you have problems sleeping:  ? Try not to nap too close to your bedtime. ? Exercise regularly. Walking is a good choice. ? Try a glass of warm milk or caffeine-free herbal tea before bed. · Do tasks and activities during the time of day when you feel your best. It may help to develop a daily routine. · Post labels, lists, and sticky notes to help you remember things. Write your activities on a calendar you can easily find. Put your clock where you can easily see it. · Stay active. Take walks in familiar places, or with friends or loved ones. Try to stay active mentally too. Read and work crossword puzzles if you enjoy these activities. · Do not drive unless you can pass an on-road driving test. If you are not sure if you are safe to drive, your state 's license bureau can test you. · Keep a cordless phone and a flashlight with new batteries by your bed. If possible, put a phone in each of the main rooms of your house, or carry a cell phone in case you fall and cannot reach a phone. Or, you can wear a device around your neck or wrist. You push a button that sends a signal for help. Acknowledge your emotions and plan for the future  · Talk openly and honestly with your doctor. · Let yourself grieve. It is common to feel angry, scared, frustrated, anxious, or depressed. · Get emotional support from family, friends, a support group, or a counselor experienced in working with people who have dementia. · Ask for help if you need it. · Tell your doctor how you feel. You may feel upset, angry, or worried at times. Many things can cause this, including poor sleep, medicine side effects, confusion, and pain. Your doctor may be able to help you. · Plan for the future. ? Talk to your family and doctor about preparing a living will and other important papers while you can make decisions. These papers tell your doctors how to care for you at the end of your life. ? Consider naming a person to make decisions about your care if you are not able to.   When should you call for help?  Call 911 anytime you think you may need emergency care. For example, call if:    · You are lost and do not know whom to call.     · You are injured and do not know whom to call.    Call your doctor now or seek immediate medical care if:    · You are more confused or upset than usual.     · You feel like you could hurt yourself because your mind is not working well.   Alexis Cain closely for changes in your health, and be sure to contact your doctor if you have any problems. Where can you learn more? Go to http://huma-samreen.info/  Enter P204 in the search box to learn more about \"Dementia: Care Instructions. \"  Current as of: May 28, 2019Content Version: 12.4  © 6193-8983 Healthwise, Incorporated. Care instructions adapted under license by [employer] (which disclaims liability or warranty for this information). If you have questions about a medical condition or this instruction, always ask your healthcare professional. [place] [address] any warranty or liability for your use of this information. Anorexia: Care Instructions  Your Care Instructions    Anorexia is a type of eating disorder. People who have anorexia don't eat enough to stay at a normal weight. Sometimes they also exercise too much. They do these things because they're so afraid of gaining weight. They believe that they're fat, even when they're thin. Often they think that losing even more weight will solve their problems and make their lives better. If you have anorexia, it can really harm your health. This is because your body doesn't get the nutrition it needs. The first step to controlling the problem is admitting that something is wrong. Then counseling can help you change how you think about food, the way you see your body, and any other emotional issues. It may take months or years, but you can recover from anorexia. Follow-up care is a key part of your treatment and safety.  Be sure to make and go to all appointments, and call your doctor if you are having problems. It's also a good idea to know your test results and keep a list of the medicines you take. How can you care for yourself at home? Follow your treatment plan  · Go to your counseling sessions. Call or talk with your counselor if you can't go or if you think the sessions aren't helping. Don't just stop going. · Take your medicines exactly as prescribed. Call your doctor if you think you are having a problem with your medicine. You will get more details on the specific medicines your doctor prescribes. Trust people who are helping you  · Listen to what counselors and nutrition experts say about healthy eating. · Learn about what is included in a balanced diet. Then discuss what you learn. · Let people know how you are feeling. Listen to how they are feeling too. · Accept support and feedback from other people. Learn to be easier on yourself  · Learn to focus on your good qualities. · If your body feels weak, slow down and do less. · Remind yourself that feeling bad about yourself is all part of your disorder. · Remember that it takes time to recover from anorexia. · Spend time with other people doing things you enjoy. · Try to focus on one goal at a time. · Don't blame yourself for your disorder. Develop healthy eating habits  · With your doctor and counselor, you will decide on a good weight for you. You will also decide how much weight you will gain each week. · Try not to argue with the people you eat with. Arguing increases stress. And stress can make make it harder for you to relax and eat. · Talk with other people during meals about things that interest you. Don't talk about food or gaining weight. Caring for a loved one with anorexia  · Remind yourself that anorexia is a long-lasting disorder. It takes time for changes to occur.   · Show love and support for your loved one, especially if he or she gets discouraged. · Support your loved one as he or she goes through the steps of recovery. Focus on wellness. Don't focus on food. · Take care of yourself. Continue with your own interests, career, hobbies, and friends. · Don't blame yourself or look for the reason for the disorder. · If you are having a hard time, talk with a counselor. · Learn what to do if your loved one relapses. When should you call for help? Call 911 anytime you think you may need emergency care. For example, call if:    · A person with anorexia seems depressed and is talking about suicide. If the talk about suicide seems real, stay with the person, or ask someone you trust to stay with the person, until you get emergency help.     · You have a rapid or irregular heartbeat.     · You passed out (lost consciousness).    Call your doctor now or seek immediate medical care if:    · You feel hopeless or have thoughts of hurting yourself.    Watch closely for changes in your health, and be sure to contact your doctor if:    · You have trouble sleeping.     · You feel anxious or depressed. Where can you learn more? Go to http://huma-samreen.info/  Enter P692 in the search box to learn more about \"Anorexia: Care Instructions. \"  Current as of: May 28, 2019Content Version: 12.4  © 5314-0929 Healthwise, Incorporated. Care instructions adapted under license by [employer] (which disclaims liability or warranty for this information). If you have questions about a medical condition or this instruction, always ask your healthcare professional. [place] [address] any warranty or liability for your use of this information. Hypothyroidism: Care Instructions  Your Care Instructions    You have hypothyroidism, which means that your body is not making enough thyroid hormone. This hormone helps your body use energy.  If your thyroid level is low, you may feel tired, be constipated, have an increase in your blood pressure, or have dry skin or memory problems. You may also get cold easily, even when it is warm. Women with low thyroid levels may have heavy menstrual periods. A blood test to find your thyroid-stimulating hormone (TSH) level is used to check for hypothyroidism. A high TSH level may mean that you have low thyroid. When your body is not making enough thyroid hormone, TSH levels rise in an effort to make the body produce more. The treatment for hypothyroidism is to take thyroid hormone pills. You should start to feel better in 1 to 2 weeks. But it can take several months to see changes in the TSH level. You will need regular visits with your doctor to make sure you have the right dose of medicine. Most people need treatment for the rest of their lives. You will need to see your doctor regularly to have blood tests and to make sure you are doing well. Follow-up care is a key part of your treatment and safety. Be sure to make and go to all appointments, and call your doctor if you are having problems. It's also a good idea to know your test results and keep a list of the medicines you take. How can you care for yourself at home? · Take your thyroid hormone medicine exactly as prescribed. Call your doctor if you think you are having a problem with your medicine. Most people do not have side effects if they take the right amount of medicine regularly. ? Take the medicine 30 minutes before breakfast, and do not take it with calcium, vitamins, or iron. ? Do not take extra doses of your thyroid medicine. It will not help you get better any faster, and it may cause side effects. ? If you forget to take a dose, do NOT take a double dose of medicine. Take your usual dose the next day. · Tell your doctor about all prescription, herbal, or over-the-counter products you take. · Take care of yourself. Eat a healthy diet, get enough sleep, and get regular exercise. When should you call for help?   Call 46 anytime you think you may need emergency care. For example, call if:    · You passed out (lost consciousness).     · You have severe trouble breathing.     · You have a very slow heartbeat (less than 60 beats a minute).     · You have a low body temperature (95°F or below).    Call your doctor now or seek immediate medical care if:    · You feel tired, sluggish, or weak.     · You have trouble remembering things or concentrating.     · You do not begin to feel better 2 weeks after starting your medicine.    Watch closely for changes in your health, and be sure to contact your doctor if you have any problems. Where can you learn more? Go to http://huma-samreen.info/  Enter J147 in the search box to learn more about \"Hypothyroidism: Care Instructions. \"  Current as of: July 28, 2019Content Version: 12.4  © 4043-0368 Healthwise, Incorporated. Care instructions adapted under license by [employer] (which disclaims liability or warranty for this information). If you have questions about a medical condition or this instruction, always ask your healthcare professional. [address] any warranty or liability for your use of this information.

## 2020-03-27 DIAGNOSIS — G47.9 SLEEP TROUBLE: ICD-10-CM

## 2020-03-27 RX ORDER — HALOPERIDOL 0.5 MG/1
0.5 TABLET ORAL
Qty: 30 TAB | Refills: 1 | Status: SHIPPED | OUTPATIENT
Start: 2020-03-27 | End: 2022-09-28

## 2020-05-19 ENCOUNTER — TELEPHONE (OUTPATIENT)
Dept: FAMILY MEDICINE CLINIC | Age: 65
End: 2020-05-19

## 2020-05-19 ENCOUNTER — VIRTUAL VISIT (OUTPATIENT)
Dept: FAMILY MEDICINE CLINIC | Age: 65
End: 2020-05-19

## 2020-05-19 DIAGNOSIS — Z78.9 DECREASED ACTIVITIES OF DAILY LIVING (ADL): ICD-10-CM

## 2020-05-19 DIAGNOSIS — F03.91 DEMENTIA WITH BEHAVIORAL DISTURBANCE, UNSPECIFIED DEMENTIA TYPE: Primary | ICD-10-CM

## 2020-05-19 DIAGNOSIS — G47.9 SLEEP TROUBLE: ICD-10-CM

## 2020-05-19 DIAGNOSIS — E03.9 HYPOTHYROIDISM, UNSPECIFIED TYPE: ICD-10-CM

## 2020-05-19 DIAGNOSIS — R32 URINARY INCONTINENCE, UNSPECIFIED TYPE: ICD-10-CM

## 2020-05-19 DIAGNOSIS — Z86.19 H/O SYPHILIS: ICD-10-CM

## 2020-05-19 NOTE — PROGRESS NOTES
Geo Tapia is a 77 y.o. female who was seen by synchronous (real-time) audio-video technology on 5/19/2020. Consent: Geo Tapia, who was seen by synchronous (real-time) audio-video technology, and/or her healthcare decision maker, is aware that this patient-initiated, Telehealth encounter on 5/19/2020 is a billable service, with coverage as determined by her insurance carrier. She is aware that she may receive a bill and has provided verbal consent to proceed: Yes. Assessment & Plan:   Diagnoses and all orders for this visit:    Dementia with behavioral disturbance, unspecified dementia type [facility]): Following [facility]/neurology. Reviewed recent neurology note. Mentioned that trazodone was not helping for sleep so changed to Remeron 7.5 mg for 1 week and then bump it to 10 mg. Eventually plan was to come off of the Haldol. Her niece and nephew were not aware of these changes. They will contact the neurology office. Also neurosyphilis. Plan to treat by ID at [facility]. They will contact the ID office as well as it got postponed due to ongoing pandemic situation. We will follow-up next visit. Advised family to follow neurology and ID recommendations. Sleep trouble: Partly due to neurosyphilis/dementia. See above    Hypothyroidism, unspecified type: advise to repeat TSH. Orders are in. Decreased activities of daily living (ADL): due to dementia. Family is helping. Also home health nurse/aide been set up as well. Family was concerned about increased hours of home health help. The nephew who is POA for patient will call back at the office and provide the home health nurse contact information to speak with them further regarding it. H/O syphilis: will follow infectious disease at [facility]. Urinary incontinence, unspecified type: Due to dementia. Using depends. No concern. During the video virtual visit patient was sitting.   Most of the communication done by her niece. Again reminded family to complete the thyroid funct/ scheduled exam and mammogram.    Family understood and agree with above plan. She is due for preventive immunization. We discussed that when she comes for in person visit. Subjective:   Carlo Srivastava is a 77 y.o. female who was seen for Follow-up  Done visit with [facility]. History of syphilis and dementia related to it. Family is a caretaker mainly  her niece and nephew. Lately having some difficulty with the sleep and behavior changes. She been following [facility]. Also she is following ID at [facility]. Before all this pandemic situation started she will plan to receive the antibiotic for syphilis treatment. Also reviewed the neurology notes and regarding her sleep and behavior changes plan to start the Remeron and discontinue trazodone. Per  Niece she was not aware on that changes and she will contact the neurology office. Also plan for discontinue Haldol as it was not helping. Also advised the family to contact the infectious disease department for further plan regarding antibiotic treatment. During the virtual visit she was sitting comfortable and did not appear in any acute distress. Most of the conversation done by her niece. No cold or cough, no chest pain or shortness of breath. No fever. No headache or dizziness complaint. No nausea or vomiting or abdominal pain. Still has low appetite. But able to eat. Noted elevated TSH. She is on the Synthroid. Taking it with compliance. She is supposed to repeat the thyroid function but has not done yet. Reminder was done. Had some urine incontinence probably due to dementia. Using depends. No concern at this time. Daily bowel movement. Reviewed labs.   Lab Results   Component Value Date/Time    WBC 5.2 01/09/2020 12:00 AM    HGB 12.9 01/09/2020 12:00 AM    HCT 40.1 01/09/2020 12:00 AM    PLATELET 622 99/60/1678 12:00 AM    MCV 86 01/09/2020 12:00 AM     Lab Results   Component Value Date/Time    Sodium 146 (H) 01/09/2020 12:00 AM    Potassium 3.7 01/09/2020 12:00 AM    Chloride 107 (H) 01/09/2020 12:00 AM    CO2 24 01/09/2020 12:00 AM    Glucose 79 01/09/2020 12:00 AM    BUN 10 01/09/2020 12:00 AM    Creatinine 0.70 01/09/2020 12:00 AM    BUN/Creatinine ratio 14 01/09/2020 12:00 AM    GFR est  01/09/2020 12:00 AM    GFR est non-AA 91 01/09/2020 12:00 AM    Calcium 8.8 01/09/2020 12:00 AM    Bilirubin, total <0.2 01/09/2020 12:00 AM    AST (SGOT) 17 01/09/2020 12:00 AM    Alk. phosphatase 100 01/09/2020 12:00 AM    Protein, total 6.9 01/09/2020 12:00 AM    Albumin 3.7 01/09/2020 12:00 AM    A-G Ratio 1.2 01/09/2020 12:00 AM    ALT (SGPT) 11 01/09/2020 12:00 AM       Lab Results   Component Value Date/Time    TSH 13.940 (H) 01/09/2020 12:00 AM         Prior to Admission medications    Medication Sig Start Date End Date Taking? Authorizing Provider   haloperidoL (HALDOL) 0.5 mg tablet Take 1 Tab by mouth nightly. 3/27/20  Yes Renzo Palacios MD   traZODone (DESYREL) 50 mg tablet Take 50 mg by mouth nightly. Yes Provider, Historical   prednisoLONE acetate (PRED FORTE) 1 % ophthalmic suspension INSTILL 1 DROP INTO LEFT EYE 4 TIMES DAILY 2/23/20  Yes Provider, Historical   levothyroxine (SYNTHROID) 75 mcg tablet Take 1 Tab by mouth Daily (before breakfast). 3/16/20  Yes Renzo Palacios MD   multivitamin (ONE A DAY) tablet Take 1 Tab by mouth daily. 3/16/20  Yes Renzo Palacios MD     No Known Allergies    There are no active problems to display for this patient. Current Outpatient Medications   Medication Sig Dispense Refill    haloperidoL (HALDOL) 0.5 mg tablet Take 1 Tab by mouth nightly. 30 Tab 1    traZODone (DESYREL) 50 mg tablet Take 50 mg by mouth nightly.       prednisoLONE acetate (PRED FORTE) 1 % ophthalmic suspension INSTILL 1 DROP INTO LEFT EYE 4 TIMES DAILY      levothyroxine (SYNTHROID) 75 mcg tablet Take 1 Tab by mouth Daily (before breakfast). 30 Tab 1    multivitamin (ONE A DAY) tablet Take 1 Tab by mouth daily. 90 Tab 0     No Known Allergies  Past Medical History:   Diagnosis Date    Eating disorder        ROS: See HPI      Objective: There were no vitals taken for this visit. General: alert, cooperative, no distress   Mental  status: Awake . Sitting comfortable. Did not appear in any acute distress. Resp: no respiratory distress   Neuro: no gross deficits   Skin: no discoloration or lesions of concern on visible areas   Psychiatric: normal affect,      Additional exam findings: We discussed the expected course, resolution and complications of the diagnosis(es) in detail. Medication risks, benefits, costs, interactions, and alternatives were discussed as indicated. I advised her to contact the office if her condition worsens, changes or fails to improve as anticipated. She expressed understanding with the diagnosis(es) and plan. Aracelis Zayas is a 77 y.o. female who was evaluated by a video visit encounter for concerns as above. Patient identification was verified prior to start of the visit. A caregiver was present when appropriate. Due to this being a TeleHealth encounter (During [facility] public health emergency), evaluation of the following organ systems was limited: Vitals/Constitutional/EENT/Resp/CV/GI//MS/Neuro/Skin/Heme-Lymph-Imm. Pursuant to the emergency declaration under the [facility], 1135 waiver authority and the [employer] and [employer] General Act, this Virtual  Visit was conducted, with patient's (and/or legal guardian's) consent, to reduce the patient's risk of exposure to COVID-19 and provide necessary medical care. Services were provided through a video synchronous discussion virtually to substitute for in-person clinic visit. Patient and provider were located at their individual homes.       Steph Barnett MD

## 2020-05-19 NOTE — TELEPHONE ENCOUNTER
Pt's daughter called to leave phone # with Corinna requested by Dr Addison Chen . [address] [facility]/ [employer] [phone].

## 2020-05-19 NOTE — TELEPHONE ENCOUNTER
Left a voice message asking patient's  to return call to me at [phone] regarding request for increased home health hours. Ms. Mary Alejandre was advised I will need a fax number so I can submit office notes to her for review.

## 2020-05-20 NOTE — TELEPHONE ENCOUNTER
Spoke with patient's insurance care coordinator, Julia Gaitan, to advise patient's family is requesting additional care/home health hours. Lucinda Barnard said we will need to fax letter or office notes to the home health agency and they will need to submit request along with their documentation to [employer] for review. Lucinda Barnard stated [facility] already has contact information, but if a fax number is needed, they can fax documentation to [phone]. I contacted Geovani Llanos at the home health agency. She stated Dr. Johan Wiseman will need to write a letter explaining why patient needs additional care hours and letter must state patient requests care 7 days/week for a total of 56 hours per week. Letter will need to be faxed to the home health agency, at [phone] (Attn: Quality Care), so they can submit request to patient's insurance. Routing message to Dr. Johan Wiseman.

## 2020-05-22 NOTE — TELEPHONE ENCOUNTER
Updated letter (including number of hours per week) has been faxed to the home health agency, at [phone]. A fax confirmation has been received.

## 2021-05-21 ENCOUNTER — VIRTUAL VISIT (OUTPATIENT)
Dept: FAMILY MEDICINE CLINIC | Age: 66
End: 2021-05-21

## 2021-05-21 DIAGNOSIS — A52.3 NEUROSYPHILIS: Primary | ICD-10-CM

## 2021-05-21 DIAGNOSIS — E03.9 HYPOTHYROIDISM, UNSPECIFIED TYPE: ICD-10-CM

## 2021-05-21 DIAGNOSIS — F02.80 DEMENTIA ASSOCIATED WITH OTHER UNDERLYING DISEASE WITHOUT BEHAVIORAL DISTURBANCE (HCC): ICD-10-CM

## 2021-05-21 NOTE — PROGRESS NOTES
I have called the patient as it was phone check visit. Niece picked up the phone and on the background nephew was also present. While beginning of the visit niece said patient is not taking any medication. I asked since how long they were not sure: Did not have any medication bottles and upon asking few questions that when she saw the last to neurology, what medication she needs refill and what she was on, taking thyroid medication or not etc. the niece and nephew said they will call back and make an appointment to get seen in person. They wanted to end the visit and make in person appointment. I have advised them to call back the office to make that appointment to discuss it further and to figure out what is going on with the patient and medication issue.

## 2021-09-15 ENCOUNTER — TELEPHONE (OUTPATIENT)
Dept: FAMILY MEDICINE CLINIC | Age: 66
End: 2021-09-15

## 2021-09-15 NOTE — TELEPHONE ENCOUNTER
Last conversation with the family during virtual visit they supposed to call back and make in person visit. I do not see any appointment or have not seen patient since then. Please call them and make an appointment to get seen in person. Also needed to bring all the medication as family was not sure what medication patient is on. Advanced dementia. Also following neurology. Adjusted medicine.   Please make sure patient has an appointment

## 2022-08-31 ENCOUNTER — OFFICE VISIT (OUTPATIENT)
Dept: FAMILY MEDICINE CLINIC | Age: 67
End: 2022-08-31

## 2022-09-28 ENCOUNTER — HOSPITAL ENCOUNTER (OUTPATIENT)
Dept: LAB | Age: 67
Discharge: HOME | End: 2022-09-28

## 2022-09-28 ENCOUNTER — OFFICE VISIT (OUTPATIENT)
Dept: FAMILY MEDICINE CLINIC | Age: 67
End: 2022-09-28
Payer: MEDICAID

## 2022-09-28 VITALS
WEIGHT: 94.2 LBS | DIASTOLIC BLOOD PRESSURE: 100 MMHG | TEMPERATURE: 96.9 F | SYSTOLIC BLOOD PRESSURE: 128 MMHG | BODY MASS INDEX: 18.09 KG/M2 | RESPIRATION RATE: 16 BRPM

## 2022-09-28 DIAGNOSIS — D64.9 CHRONIC ANEMIA: ICD-10-CM

## 2022-09-28 DIAGNOSIS — Z12.31 ENCOUNTER FOR SCREENING MAMMOGRAM FOR MALIGNANT NEOPLASM OF BREAST: ICD-10-CM

## 2022-09-28 DIAGNOSIS — R73.01 IMPAIRED FASTING BLOOD SUGAR: ICD-10-CM

## 2022-09-28 DIAGNOSIS — Z12.11 SCREENING FOR COLON CANCER: ICD-10-CM

## 2022-09-28 DIAGNOSIS — Z78.0 POSTMENOPAUSAL: ICD-10-CM

## 2022-09-28 DIAGNOSIS — N28.9 RENAL INSUFFICIENCY: ICD-10-CM

## 2022-09-28 DIAGNOSIS — R63.0 POOR APPETITE: ICD-10-CM

## 2022-09-28 DIAGNOSIS — A52.3 NEUROSYPHILIS: ICD-10-CM

## 2022-09-28 DIAGNOSIS — Z23 NEEDS FLU SHOT: Primary | ICD-10-CM

## 2022-09-28 DIAGNOSIS — E03.9 HYPOTHYROIDISM, UNSPECIFIED TYPE: ICD-10-CM

## 2022-09-28 DIAGNOSIS — F02.80 DEMENTIA ASSOCIATED WITH OTHER UNDERLYING DISEASE WITHOUT BEHAVIORAL DISTURBANCE (HCC): ICD-10-CM

## 2022-09-28 DIAGNOSIS — F03.91 DEMENTIA WITH BEHAVIORAL DISTURBANCE, UNSPECIFIED DEMENTIA TYPE: ICD-10-CM

## 2022-09-28 LAB — XX-LABCORP SPECIMEN COL,LCBCF: NORMAL

## 2022-09-28 PROCEDURE — 99001 SPECIMEN HANDLING PT-LAB: CPT

## 2022-09-28 PROCEDURE — 1123F ACP DISCUSS/DSCN MKR DOCD: CPT | Performed by: FAMILY MEDICINE

## 2022-09-28 PROCEDURE — 90694 VACC AIIV4 NO PRSRV 0.5ML IM: CPT | Performed by: FAMILY MEDICINE

## 2022-09-28 PROCEDURE — 99214 OFFICE O/P EST MOD 30 MIN: CPT | Performed by: FAMILY MEDICINE

## 2022-09-28 RX ORDER — BISMUTH SUBSALICYLATE 262 MG
1 TABLET,CHEWABLE ORAL DAILY
Qty: 90 TABLET | Refills: 0 | Status: SHIPPED | OUTPATIENT
Start: 2022-09-28

## 2022-09-28 RX ORDER — LANOLIN ALCOHOL/MO/W.PET/CERES
CREAM (GRAM) TOPICAL
COMMUNITY
Start: 2022-07-12 | End: 2022-09-28 | Stop reason: SDUPTHER

## 2022-09-28 RX ORDER — LEVOTHYROXINE SODIUM 50 UG/1
50 TABLET ORAL
Qty: 90 TABLET | Refills: 0 | Status: SHIPPED | OUTPATIENT
Start: 2022-09-28 | End: 2022-09-29 | Stop reason: ALTCHOICE

## 2022-09-28 RX ORDER — LEVOTHYROXINE SODIUM 50 UG/1
TABLET ORAL
COMMUNITY
Start: 2022-08-25 | End: 2022-09-28 | Stop reason: SDUPTHER

## 2022-09-28 RX ORDER — LANOLIN ALCOHOL/MO/W.PET/CERES
1000 CREAM (GRAM) TOPICAL DAILY
Qty: 90 TABLET | Refills: 1 | Status: SHIPPED | OUTPATIENT
Start: 2022-09-28

## 2022-09-28 NOTE — PROGRESS NOTES
1. \"Have you been to the ER, urgent care clinic since your last visit? Hospitalized since your last visit? \" Robb 1/20/22 - 1/23/22. [facility] 8/2022.    2. \"Have you seen or consulted any other health care providers outside of the [address] [place] [address] since your last visit? \" No     3. For patients aged 39-70: Has the patient had a colonoscopy / FIT/ Cologuard? No      If the patient is female:    4. For patients aged 41-77: Has the patient had a mammogram within the past 2 years? No      5. For patients aged 21-65: Has the patient had a pap smear?  No

## 2022-09-28 NOTE — PROGRESS NOTES
HISTORY OF PRESENT ILLNESS  Baylee Merchant is a 69 y.o. female. HPI: Here for follow up after hospital discharge. Review discharge summery from the chart. She was taken twice to the hospital. Initially diagnosed with syncope and hypothyroidism. Non compliance with medication. Dementia. Also elevated BNP and troponin. No EKG changes. No acute findings on ECHO . Result as below. Second hospital visit sepsis and encephalopathy due to UTI. Currently sitting comfortable without any acute distress. Accompanied with her granddaughter. Able to feed herself. Dressing and bathing by herself. Not able to manage her money. Not able to cook. Able to go to bathroom by herself. Does use depends. Currently no signs of volume over load. No headache or dizziness. No nausea or vomiting. No abdominal pain. No appetite or weight changes. Visit Vitals  BP (!) 128/100 (BP 1 Location: Left upper arm, BP Patient Position: Sitting, BP Cuff Size: Adult)   Temp 96.9 °F (36.1 °C) (Temporal)   Resp 16   Wt 94 lb 3.2 oz (42.7 kg)   BMI 18.09 kg/m²     Review medication list, vitals, problem list,allergies. Lab Results   Component Value Date/Time    WBC 5.2 01/09/2020 12:00 AM    HGB 12.9 01/09/2020 12:00 AM    HCT 40.1 01/09/2020 12:00 AM    PLATELET 318 25/64/0852 12:00 AM    MCV 86 01/09/2020 12:00 AM     Lab Results   Component Value Date/Time    Sodium 146 (H) 01/09/2020 12:00 AM    Potassium 3.7 01/09/2020 12:00 AM    Chloride 107 (H) 01/09/2020 12:00 AM    CO2 24 01/09/2020 12:00 AM    Glucose 79 01/09/2020 12:00 AM    BUN 10 01/09/2020 12:00 AM    Creatinine 0.70 01/09/2020 12:00 AM    BUN/Creatinine ratio 14 01/09/2020 12:00 AM    GFR est  01/09/2020 12:00 AM    GFR est non-AA 91 01/09/2020 12:00 AM    Calcium 8.8 01/09/2020 12:00 AM    Bilirubin, total <0.2 01/09/2020 12:00 AM    Alk.  phosphatase 100 01/09/2020 12:00 AM    Protein, total 6.9 01/09/2020 12:00 AM    Albumin 3.7 01/09/2020 12:00 AM    A-G Ratio 1.2 01/09/2020 12:00 AM    ALT (SGPT) 11 01/09/2020 12:00 AM    AST (SGOT) 17 01/09/2020 12:00 AM       Lab Results   Component Value Date/Time    TSH 13.940 (H) 01/09/2020 12:00 AM         Syncope  -likely 2/2 ETOH vs vasovagal   -CT head unremarkable  -No events on tele  -PT/OT recommend HH  -echo 7/12 without valvular dz, normal EF    Elevated BNP  -958 on admit  -no s/s HF or fluid overload  -echo 7/12 unremarkable    Elevated troponin: Suspect type II MI secondary to demand  -Troponin flat  -In setting of syncope  -no CP, EKG unchanged from prior    Severe hypothyroidism  -TSH 28, Ft4 0.5  -hasn't been taking synthroid per nephew  -synthroid 50mcg daily with follow up TSH in 6 weeks ordered  -needs to follow with PCP    Vitamin B12 dificency  -b12 low normal  -1000mcg IM B12 given  -2000mcg PO daily starting tomorrow ordered  ROS: see hPI     Physical Exam  Constitutional:       General: She is not in acute distress. Cardiovascular:      Rate and Rhythm: Normal rate and regular rhythm. Heart sounds: Normal heart sounds. Abdominal:      General: Bowel sounds are normal.      Palpations: Abdomen is soft. Tenderness: There is no abdominal tenderness. Musculoskeletal:         General: No swelling. Neurological:      General: No focal deficit present. Psychiatric:         Behavior: Behavior normal.       ASSESSMENT and PLAN    ICD-10-CM ICD-9-CM    1. Needs flu shot  Z23 V04.81 INFLUENZA, FLUAD, (AGE 65 Y+), IM, PF, 0.5 ML      2. Dementia associated with other underlying disease without behavioral disturbance ([facility] [address] : stable. No new concern from family. Will observe. F02.80 294.10       3. Hypothyroidism, unspecified type : started new medication from the hospital. Will recheck labs. Following endo. Further adjustment after result  E03.9 244.9 TSH 3RD GENERATION      REFERRAL TO ENDOCRINOLOGY      4. Renal insufficiency : recheck labs. T08.9 697.8 METABOLIC PANEL, BASIC      5.  Neurosyphilis : reason for dementia. Memory changes. Will observe. A52.3 094.9       6. Chronic anemia : for now multivitamin. Recheck labs. D64.9 285.9       7. Impaired fasting blood sugar  R73.01 790.21       8. Poor appetite : improved. On multivitamin. R63.0 783.0 multivitamin (ONE A DAY) tablet      9. Encounter for screening mammogram for malignant neoplasm of breast  Z12.31 V76.12 GARRICK MAMMO BI SCREENING INCL CAD      8. Screening for colon cancer  Z12.11 V76.51 OCCULT BLOOD IMMUNOASSAY,DIAGNOSTIC      11. Postmenopausal  Z78.0 V49.81 DEXA BONE DENSITY STUDY AXIAL      12. Dementia with behavioral disturbance, unspecified dementia type ([facility] [address] : no behavior changes or concern from family. F03.91 294.21       Pt understood and agree with the plan   Mildly elevated blood pressure. Will observe for now. Low salt diet. Follow-up and Dispositions    Return in about 4 months (around 1/28/2023). Please note that this dictation was completed with Phizzle, the mi[employer] voice recognition software. Quite often unanticipated grammatical, syntax, homophones, and other interpretive errors are inadvertently transcribed by the computer software. Please disregard these errors. Please excuse any errors that have escaped final proofreading.

## 2022-09-28 NOTE — PROGRESS NOTES
Patient presents for the following vaccines: Fluad. Patient consent obtained by patient. Patient tolerated procedure well at left deltoid. Patient remained in exam room, during vaccine documentation, for period of 10 minutes post injection to ensure no reaction. VIS was given to patient.     Lot # [id number]  Exp: 5/20/23  SADEG: [employer].  [address]: [phone]

## 2022-09-28 NOTE — PATIENT INSTRUCTIONS
Vaccine Information Statement    Influenza (Flu) Vaccine (Inactivated or Recombinant): What You Need to Know    Many vaccine information statements are available in [place]men and other languages. See www.immunize.org/vis. Hojas de información sobre vacunas están disponibles en español y en muchos otros idiomas. Visite www.immunize.org/vis. 1. Why get vaccinated? Influenza vaccine can prevent influenza (flu). Flu is a contagious disease that spreads around the [facility] every year, usually between October and May. Anyone can get the flu, but it is more dangerous for some people. Infants and young children, people 72 years and older, pregnant people, and people with certain health conditions or a weakened immune system are at greatest risk of flu complications. Pneumonia, bronchitis, sinus infections, and ear infections are examples of flu-related complications. If you have a medical condition, such as heart disease, cancer, or diabetes, flu can make it worse. Flu can cause fever and chills, sore throat, muscle aches, fatigue, cough, headache, and runny or stuffy nose. Some people may have vomiting and diarrhea, though this is more common in children than adults. In an average year, thousands of people in the [facility] die from flu, and many more are hospitalized. Flu vaccine prevents millions of illnesses and flu-related visits to the doctor each year. 2. Influenza vaccines     CDC recommends everyone 6 months and older get vaccinated every flu season. Children 6 months through 6years of age may need 2 doses during a single flu season. Everyone else needs only 1 dose each flu season. It takes about 2 weeks for protection to develop after vaccination. There are many flu viruses, and they are always changing. Each year a new flu vaccine is made to protect against the influenza viruses believed to be likely to cause disease in the upcoming flu season.  Even when the vaccine doesnt exactly match these viruses, it may still provide some protection. Influenza vaccine does not cause flu. Influenza vaccine may be given at the same time as other vaccines. 3. Talk with your health care provider    Tell your vaccination provider if the person getting the vaccine:  Has had an allergic reaction after a previous dose of influenza vaccine, or has any severe, life-threatening allergies   Has ever had Guillain-Barré Syndrome (also called GBS)    In some cases, your health care provider may decide to postpone influenza vaccination until a future visit. Influenza vaccine can be administered at any time during pregnancy. People who are or will be pregnant during influenza season should receive inactivated influenza vaccine. People with minor illnesses, such as a cold, may be vaccinated. People who are moderately or severely ill should usually wait until they recover before getting influenza vaccine. Your health care provider can give you more information. 4. Risks of a vaccine reaction    Soreness, redness, and swelling where the shot is given, fever, muscle aches, and headache can happen after influenza vaccination. There may be a very small increased risk of Guillain-Barré Syndrome (GBS) after inactivated influenza vaccine (the flu shot). [place] children who get the flu shot along with pneumococcal vaccine (PCV13) and/or DTaP vaccine at the same time might be slightly more likely to have a seizure caused by fever. Tell your health care provider if a child who is getting flu vaccine has ever had a seizure. People sometimes faint after medical procedures, including vaccination. Tell your provider if you feel dizzy or have vision changes or ringing in the ears. As with any medicine, there is a very remote chance of a vaccine causing a severe allergic reaction, other serious injury, or death. 5. What if there is a serious problem?     An allergic reaction could occur after the vaccinated person leaves the clinic. If you see signs of a severe allergic reaction (hives, swelling of the face and throat, difficulty breathing, a fast heartbeat, dizziness, or weakness), call 9-1-1 and get the person to the nearest hospital.    For other signs that concern you, call your health care provider. Adverse reactions should be reported to the Vaccine Adverse Event Reporting System (VAERS). Your health care provider will usually file this report, or you can do it yourself. Visit the VAERS website at www.vaers. [facility].gov or call 8-826.517.8565. VAERS is only for reporting reactions, and VAERS staff members do not give medical advice. 6. The National Vaccine Injury Compensation Program    The [facility] Vaccine Injury Compensation Program (VICP) is a federal program that was created to compensate people who may have been injured by certain vaccines. Claims regarding alleged injury or death due to vaccination have a time limit for filing, which may be as short as two years. Visit the VICP website at www.[facility].gov/vaccinecompensation or call 4-402.971.2620 to learn about the program and about filing a claim. 7. How can I learn more? Ask your health care provider. Call your local or state health department. Visit the website of the Food and Drug Administration (FDA) for vaccine package inserts and additional information at www.fda.gov/vaccines-blood-biologics/vaccines. Contact the Centers for Disease Control and Prevention (CDC): Call 3-360.516.8870 (7-761-RGJ-INFO) or  Visit CDCs influenza website at www.cdc.gov/flu. Vaccine Information Statement   Inactivated Influenza Vaccine   8/6/2021  [id number]   Department of Health and Human Services  Centers for Disease Control and Prevention    Office Use Only

## 2022-09-29 DIAGNOSIS — R79.89 ELEVATED TSH: Primary | ICD-10-CM

## 2022-09-29 DIAGNOSIS — R94.6 ABNORMAL THYROID FUNCTION TEST: ICD-10-CM

## 2022-09-29 LAB
BUN SERPL-MCNC: 10 MG/DL (ref 8–27)
BUN/CREAT SERPL: 15 (ref 12–28)
CALCIUM SERPL-MCNC: 9.1 MG/DL (ref 8.7–10.3)
CHLORIDE SERPL-SCNC: 104 MMOL/L (ref 96–106)
CO2 SERPL-SCNC: 23 MMOL/L (ref 20–29)
CREAT SERPL-MCNC: 0.68 MG/DL (ref 0.57–1)
EGFR: 94 ML/MIN/1.73
GLUCOSE SERPL-MCNC: 92 MG/DL (ref 70–99)
POTASSIUM SERPL-SCNC: 4.4 MMOL/L (ref 3.5–5.2)
SODIUM SERPL-SCNC: 141 MMOL/L (ref 134–144)
SPECIMEN STATUS REPORT, ROLRST: NORMAL
TSH SERPL DL<=0.005 MIU/L-ACNC: 12.2 UIU/ML (ref 0.45–4.5)

## 2022-09-29 RX ORDER — LEVOTHYROXINE SODIUM 75 UG/1
75 TABLET ORAL
Qty: 30 TABLET | Refills: 2 | Status: SHIPPED | OUTPATIENT
Start: 2022-09-29

## 2022-09-29 NOTE — PROGRESS NOTES
We will go up on her thyroid dose and she needs an appointment with endocrinology.   Referral been done yesterday

## 2022-10-27 NOTE — PROGRESS NOTES
Left a voice message on Uriah Adonaynishant' phone (HIPAA verified) at [phone] asking for return call to [facility] in regards to patient's lab results, at [phone] press option # 3 for . Ask to speak with Corinna.

## 2022-11-04 NOTE — PROGRESS NOTES
Left a voice message on Uriah Calix' phone (HIPAA verified) at [phone] asking for return call to [facility] in regards to patient's lab results, at [phone] press option # 3 for . Ask to speak with Corinna.

## 2022-11-07 NOTE — PROGRESS NOTES
Attempted to contact Mr. Pham Mohan (HIPAA verified) at listed mobile number [phone], but when answered lady said \"you have the wrong number\". Result letter has been mailed to patient.

## 2022-12-15 ENCOUNTER — OFFICE VISIT (OUTPATIENT)
Dept: FAMILY MEDICINE CLINIC | Age: 67
End: 2022-12-15
Payer: MEDICAID

## 2022-12-15 VITALS
DIASTOLIC BLOOD PRESSURE: 86 MMHG | OXYGEN SATURATION: 100 % | SYSTOLIC BLOOD PRESSURE: 128 MMHG | TEMPERATURE: 97.4 F | WEIGHT: 104.8 LBS | RESPIRATION RATE: 16 BRPM | BODY MASS INDEX: 20.13 KG/M2 | HEART RATE: 77 BPM

## 2022-12-15 DIAGNOSIS — E03.9 HYPOTHYROIDISM, UNSPECIFIED TYPE: ICD-10-CM

## 2022-12-15 DIAGNOSIS — Z74.09 DECREASED AMBULATION STATUS: ICD-10-CM

## 2022-12-15 DIAGNOSIS — E87.0 HYPERNATREMIA: ICD-10-CM

## 2022-12-15 DIAGNOSIS — R79.89 ELEVATED LFTS: ICD-10-CM

## 2022-12-15 DIAGNOSIS — R13.10 DYSPHAGIA, UNSPECIFIED TYPE: ICD-10-CM

## 2022-12-15 DIAGNOSIS — E55.9 VITAMIN D DEFICIENCY: ICD-10-CM

## 2022-12-15 DIAGNOSIS — Z86.73 STROKE, RECENT, WITHOUT LATE EFFECT: Primary | ICD-10-CM

## 2022-12-15 DIAGNOSIS — F01.A0 MILD VASCULAR DEMENTIA WITHOUT BEHAVIORAL DISTURBANCE, PSYCHOTIC DISTURBANCE, MOOD DISTURBANCE, OR ANXIETY: ICD-10-CM

## 2022-12-15 DIAGNOSIS — D64.9 CHRONIC ANEMIA: ICD-10-CM

## 2022-12-15 DIAGNOSIS — D69.6 THROMBOCYTOPENIA (HCC): ICD-10-CM

## 2022-12-15 RX ORDER — GUAIFENESIN 100 MG/5ML
81 LIQUID (ML) ORAL DAILY
COMMUNITY
Start: 2022-12-03

## 2022-12-15 RX ORDER — CLOPIDOGREL BISULFATE 75 MG/1
75 TABLET ORAL DAILY
COMMUNITY
Start: 2022-12-03 | End: 2022-12-21

## 2022-12-15 RX ORDER — LEVOTHYROXINE SODIUM 50 UG/1
TABLET ORAL
COMMUNITY

## 2022-12-15 RX ORDER — AMOXICILLIN AND CLAVULANATE POTASSIUM 875; 125 MG/1; MG/1
TABLET, FILM COATED ORAL EVERY 12 HOURS
COMMUNITY

## 2022-12-15 RX ORDER — AMLODIPINE BESYLATE 2.5 MG/1
2.5 TABLET ORAL DAILY
COMMUNITY
Start: 2022-12-02

## 2022-12-15 RX ORDER — ATORVASTATIN CALCIUM 40 MG/1
40 TABLET, FILM COATED ORAL
COMMUNITY
Start: 2022-12-02

## 2022-12-15 NOTE — PROGRESS NOTES
HISTORY OF PRESENT ILLNESS  Art Sevilla is a 69 y.o. female. HPI: Here for follow-up after hospital discharge on December 12. She is in the wheelchair and accompanied by her niece who is her nurse aide. Review discharge summary which was available from [facility]. Patient was not feeling by herself so she was taken to the hospital.  Diagnosis of stroke and aspiration pneumonia. Review radiological study results and labs. Was put on antibiotic for sepsis secondary to aspiration pneumonia. Esophageal dysmotility on barium swallow. Currently patient taking food orally. Tolerating it well per niece. Has been taking food like that since long time. Discussed if any option like feeding tube can be considered can contact the office. On her labs noted elevated sodium. Labs at discharge sodium 140. Thrombocytopenia. Has been improved. Currently no fever cold cough. She needs help with her ADL as needed assistance while she is walking with a walker to the bathroom needs. She does dress herself. Does take bath by herself. Needs help to prepare meals. Right hand fingers trigger fingers. Unable to open the fist completely. Used to be in therapy a year ago. Currently no pain or any concern. She is able to lift a glass of water and eat by herself with her left hand. Able to move her feet with instructions. Does have dementia which is gradually worsening but no behavior changes. Noncompliance with follow-up and keeping specialist appointment. Has been seeing a [facility] endocrinology for hypothyroidism. PVL VENOUS EXAM LE BILATERAL R/O DVT    Anatomical Region Laterality Modality   Lower Extremity -- Duplex Doppler     Narrative    Bilateral: The deep venous system of the bilateral lower extremities was examined using duplex ultrasound. Technically difficult examination due to patient positioning and limited mobility.  B-mode imaging demonstrated normal compressibility of the common femoral, femoral, deep femoral, popliteal, posterior tibial, and peroneal veins. There was no thrombus identified in the lower extremities. Doppler flow signals were spontaneous and pulsatile at all levels. There was no evidence of venous reflux in the deep veins or in the great saphenous vein at the saphenofemoral junction. Conclusions: No evidence of deep venous thrombosis in the bilateral lower extremities. MRI HEAD without CONTRAST    Anatomical Region Laterality Modality   Head -- Magnetic Resonance     Impression    1. Multiple (~6) small infarcts bilateral cerebral hemispheres suggestive acute to late acute infarcts. Distribution is suggestive of embolic etiology. 2. No evidence of hemorrhagic transformation. 3. Background of moderate white matter changes consistent with chronic microvascular ischemic disease. BARIUM SWALLOW    Anatomical Region Laterality Modality   Chest -- Radio Fluoroscopy     Impression    Limited study. Esophageal dysmotility without definite evidence of achalasia. Echo Cardiogram Complete    Anatomical Region Laterality Modality   -- -- Color Flow Doppler     Narrative    CONCLUSIONS     * Echocardiography Sonographer noted that this was a very technically   difficult study due to patient's inability to maintain positioning. * Left ventricular systolic function is normal with an ejection fraction of   65 % by visual estimation. * Left ventricular chamber size is decreased. * There is concentric left ventricular hypertrophy with a mildly thickened   septal wall and mildly thickened posterior wall. * Left ventricular diastolic function: indeterminate. * Right ventricular systolic function is normal with TAPSE measuring 2.03   cm. * Right ventricular chamber dimension is not well visualized. * There is mild mitral valve regurgitation. * There is mild tricuspid valve regurgitation.      * Moderate pulmonary hypertension, estimated pulmonary arterial systolic pressure is 59 mmHg. * Study done on 7/12/22 showed negative bubble study. * Other findings as noted below. US ABDOMEN LIMITED    Anatomical Region Laterality Modality   Abdomen -- Ultrasound     Impression    1. Mildly distended gallbladder with cholelithiasis. No gallbladder wall thickening or pericholecystic fluid to suggest acute cholecystitis, noting sonographic Ash sign cannot be assessed. 2. Slight increased echogenicity of the right kidney, which can be seen with medical renal disease. 3. The right kidney measures smaller than on prior exam. Finding may be technical.     CT CTA CHEST PULMONARY    Anatomical Region Laterality Modality   Chest -- Computed Tomography     Impression      Notable amount of endobronchial contents, right lung more than left, likely aspirated material and/or mucous. Right lower lobe dependent and basilar subpleural opacities, likely partly due to atelectasis, but inflammatory infiltrate not excluded. -Patulous esophagus containing notable amount of fluid. Query dysmotility/achalasia. No definite pulmonary embolus or right heart strain. Motion limits evaluation of small segmental and subsegmental arteries. STROKE CT CTA HEAD, NECK    Anatomical Region Laterality Modality   Head -- Computed Tomography     Impression      1. No large vessel occlusion. 2. Largest intracranial cerebral arteries are without significant stenosis. 3. Estimated 60-65% stenosis of the distal RCCA by smooth noncalcified atheromatous plaque. No ICA stenosis by NASCET. 4. Atherosclerosis of the bilateral subclavian arteries with severe distal left subclavian stenosis.      CT HEAD PERFUSION    Anatomical Region Laterality Modality   Head -- Computed Tomography     Impression      Automated software detected a very large area of hypoperfusion in the temporal occipital lobes and cerebellar hemispheres with no core infarct however this is very likely artifactual due to the poor input/outflow curves and significant motion artifact. ROS: See HPI    Physical Exam  Constitutional:       Appearance: Normal appearance. Cardiovascular:      Rate and Rhythm: Normal rate. Pulmonary:      Effort: Pulmonary effort is normal. No respiratory distress. Musculoskeletal:         General: No swelling. Neurological:      General: No focal deficit present. Mental Status: She is alert. Comments: Rt hand finger flexion, triggered finger. Muscle tone bilaterally equal in upper and lower ext. ASSESSMENT and PLAN    ICD-10-CM ICD-9-CM    1. Stroke, recent, without late effect: On risk factor management. On statin, Plavix. Aspirin. Blood pressure well controlled. Sending to neurology Z86.73 V12.54 REFERRAL TO HOME HEALTH    on event monitoring/       2. Mild vascular dementia without behavioral disturbance, psychotic disturbance, mood disturbance, or anxiety: Sending to neurology for further evaluation. F01. A0 290.40 REFERRAL TO HOME HEALTH      3. Chronic anemia: At this time advised multivitamin D64.9 285.9 CBC W/O DIFF      4. Hypothyroidism, unspecified type: Recent TSH within normal limits during hospitalization. Continue current plan. She has seen [facility] endocrinology. E03.9 244.9       5. Dysphagia, unspecified type: Has been doing puréed diet. Shakes. Tolerating it well. Unable to do much speech eval or swallow eval, esophageal dysmotility. Done home health referral for skilled nursing and ADL have R13.10 787.20 [facility]      6. Decreased ambulation status: Needed assistance with walker. Also need observation while doing ADL activities. Unable to stand prolonged: Following independently or with help Z74.09 780.99 [facility]      7. Vitamin D deficiency: On multivitamin E55.9 268.9 VITAMIN D, 25 HYDROXY      8. Hypernatremia  : Before discharge sodium was within normal limit.   We will recheck lab P89.4 345.8 METABOLIC PANEL, COMPREHENSIVE 9. Thrombocytopenia ([facility] [address]): We will recheck D69.6 287.5 CBC W/O DIFF      10. Elevated LFTs-: We will recheck labs. See HPI Z81.02 142.9 METABOLIC PANEL, COMPREHENSIVE      History of noncompliance with follow-ups and taking medications. Negligence been ruled out during hospitalization  At this time a cardiac monitor has been placed and needs was instructed to follow-up the hospital instructions regarding it  At this time doing the neurology referral    Niece who takes care of her understood the plan  Follow-up and Dispositions    Return in about 1 month (around 1/15/2023). Please note that this dictation was completed with Thrill, the computer voice recognition software. Quite often unanticipated grammatical, syntax, homophones, and other interpretive errors are inadvertently transcribed by the computer software. Please disregard these errors. Please excuse any errors that have escaped final proofreading.

## 2022-12-15 NOTE — PROGRESS NOTES
1. \"Have you been to the ER, urgent care clinic since your last visit? Hospitalized since your last visit? \" Yes When: [facility] 11/24/22    2. \"Have you seen or consulted any other health care providers outside of the [address] [place] [address] since your last visit? \" No     3. For patients aged 39-70: Has the patient had a colonoscopy / FIT/ Cologuard? No      If the patient is female:    4. For patients aged 41-77: Has the patient had a mammogram within the past 2 years? No      5. For patients aged 21-65: Has the patient had a pap smear?  NA - based on age or sex    Chief Complaint   Patient presents with    Hospital Follow Up     [facility] 11/24/22

## 2023-01-09 ENCOUNTER — VIRTUAL VISIT (OUTPATIENT)
Dept: FAMILY MEDICINE CLINIC | Age: 68
End: 2023-01-09
Payer: MEDICAID

## 2023-01-09 DIAGNOSIS — D64.9 CHRONIC ANEMIA: ICD-10-CM

## 2023-01-09 DIAGNOSIS — R73.01 IMPAIRED FASTING BLOOD SUGAR: ICD-10-CM

## 2023-01-09 DIAGNOSIS — F03.918 DEMENTIA WITH BEHAVIORAL DISTURBANCE: ICD-10-CM

## 2023-01-09 DIAGNOSIS — D69.6 THROMBOCYTOPENIA (HCC): Primary | ICD-10-CM

## 2023-01-09 DIAGNOSIS — R13.10 DYSPHAGIA, UNSPECIFIED TYPE: ICD-10-CM

## 2023-01-09 DIAGNOSIS — E61.1 IRON DEFICIENCY: ICD-10-CM

## 2023-01-09 DIAGNOSIS — R94.6 ABNORMAL THYROID FUNCTION TEST: ICD-10-CM

## 2023-01-09 DIAGNOSIS — F01.511 VASCULAR DEMENTIA WITH AGITATION, UNSPECIFIED DEMENTIA SEVERITY: ICD-10-CM

## 2023-01-09 PROBLEM — F03.91 DEMENTIA WITH BEHAVIORAL DISTURBANCE, UNSPECIFIED DEMENTIA TYPE: Status: RESOLVED | Noted: 2022-09-28 | Resolved: 2023-01-09

## 2023-01-09 PROCEDURE — 1123F ACP DISCUSS/DSCN MKR DOCD: CPT | Performed by: FAMILY MEDICINE

## 2023-01-09 PROCEDURE — 99214 OFFICE O/P EST MOD 30 MIN: CPT | Performed by: FAMILY MEDICINE

## 2023-01-09 NOTE — PROGRESS NOTES
Marla Berry is a 69 y.o. female who was seen by synchronous (real-time) audio-video technology on 1/9/2023 for New Order (Modified barium swallow study)        Assessment & Plan:   Diagnoses and all orders for this visit:    1. Thrombocytopenia ([address]): Recheck labs before next visit  -     CBC W/O DIFF; Future    2. Dysphagia, unspecified type: Done a referral to speech therapy to advance diet  -     REFERRAL TO SPEECH THERAPY  -     METABOLIC PANEL, COMPREHENSIVE; Future    3. Vascular dementia with agitation, unspecified dementia severity: No behavioral changes. Advised to make a follow-up with neurology    4. Dementia with behavioral disturbance: See above    5. Impaired fasting blood sugar: Diet modification. We will recheck labs before next  -     HEMOGLOBIN A1C WITH EAG; Future  -     METABOLIC PANEL, COMPREHENSIVE; Future    6. Chronic anemia: Discussed multivitamin. Recheck labs before next visit  -     CBC W/O DIFF; Future  -     IRON PROFILE; Future    7. Iron deficiency  -     IRON PROFILE; Future    8. Abnormal thyroid function test: Elevated TSH. As patient has dementia unable to obtain much history. We will recheck labs before next visit  -     TSH 3RD GENERATION; Future    Caregiver understood and agreed with the plan  Follow-up and Dispositions    Return in about 2 years (around 1/9/2025). I spent at least 15-20 minutes on this visit with this established patient. Please note that this dictation was completed with [employer], the computer voice recognition software. Quite often unanticipated grammatical, syntax, homophones, and other interpretive errors are inadvertently transcribed by the computer software. Please disregard these errors. Please excuse any errors that have escaped final proofreading. 712  Subjective:   Done visit through doxy. Done consent signed  [address] daughter and son was providing most of the information.   Pt been recently discharged from the hospital few weeks ago for sepsis. Now at the base line except needed swallow eval to advance diet. Has a home health help for ADL. She needs assistance in ambulation and going to the bathroom. Does use walker. Able to bath by herself and dress by herself. During virtual visit patient looking happy and comfortable. Well dressed. Clean. Patient looks comfortable. Caregiver denies any concern with any shortness of breath or chest pain with patient. Appetite fair and is need to advance diet. History of dementia. Mood has been stable. Behavior has been stable. Has not scheduled an appointment with neurology yet. Encouraged to schedule an appointment. Lab Results   Component Value Date/Time    WBC 5.2 01/09/2020 12:00 AM    HGB 12.9 01/09/2020 12:00 AM    HCT 40.1 01/09/2020 12:00 AM    PLATELET 173 34/51/4468 12:00 AM    MCV 86 01/09/2020 12:00 AM       Lab Results   Component Value Date/Time    TSH 12.200 (H) 09/28/2022 12:00 AM      Lab Results   Component Value Date/Time    Sodium 141 09/28/2022 12:00 AM    Potassium 4.4 09/28/2022 12:00 AM    Chloride 104 09/28/2022 12:00 AM    CO2 23 09/28/2022 12:00 AM    Glucose 92 09/28/2022 12:00 AM    BUN 10 09/28/2022 12:00 AM    Creatinine 0.68 09/28/2022 12:00 AM    BUN/Creatinine ratio 15 09/28/2022 12:00 AM    GFR est  01/09/2020 12:00 AM    GFR est non-AA 91 01/09/2020 12:00 AM    Calcium 9.1 09/28/2022 12:00 AM      Lab Results   Component Value Date/Time    Sodium 141 09/28/2022 12:00 AM    Potassium 4.4 09/28/2022 12:00 AM    Chloride 104 09/28/2022 12:00 AM    CO2 23 09/28/2022 12:00 AM    Glucose 92 09/28/2022 12:00 AM    BUN 10 09/28/2022 12:00 AM    Creatinine 0.68 09/28/2022 12:00 AM    BUN/Creatinine ratio 15 09/28/2022 12:00 AM    GFR est  01/09/2020 12:00 AM    GFR est non-AA 91 01/09/2020 12:00 AM    Calcium 9.1 09/28/2022 12:00 AM    Bilirubin, total <0.2 01/09/2020 12:00 AM    ALT (SGPT) 11 01/09/2020 12:00 AM    Alk.  phosphatase 100 01/09/2020 12:00 AM    Protein, total 6.9 01/09/2020 12:00 AM    Albumin 3.7 01/09/2020 12:00 AM    A-G Ratio 1.2 01/09/2020 12:00 AM      Contains abnormal data Lipid Complete Panel  Order: [id number]   Ref Range & Units 11/28/22 1522   Cholesterol 110 - 200 mg/dL 106 Low     Triglyceride 40 - 149 mg/dL 137    HDL >=40 mg/dL 52    Cholesterol/HDL 0.0 - 5.0 2.0    Non-HDL Cholesterol <130 mg/dL 54    LDL CALCULATION 50 - 99 mg/dL 27 Low     VLDL CALCULATION 8 - 30 mg/dL 27    LDL/HDL Ratio  0.5      Contains abnormal data Thyroid [place]  Order: [id number]   Ref Range & Units 11/25/22 0150   TSH 0.27 - 4.20 mcU/mL 8.05 High     Contains abnormal data Iron Profile now  Order: [id number]   Ref Range & Units 11/25/22 0150   Iron 30 - 160 mcg/dL 32    UIBC 110 - 370 mcg/dL 198    TIBC Calculation 228 - 428 mcg/dL 230    Iron Saturation 20 - 50 % 14 Low     Contains abnormal data Hemoglobin A1C tomorrow am  Order: [id number]   Ref Range & Units 7/19/22 0032   Hemoglobin A1c 4.8 - 5.6 % 6.1 High     Estimated Average Glucose 91 - 123 mg/dL 128 High       Prior to Admission medications    Medication Sig Start Date End Date Taking? Authorizing Provider   amLODIPine (NORVASC) 2.5 mg tablet Take 2.5 mg by mouth daily. 12/2/22  Yes Provider, Historical   aspirin 81 mg chewable tablet Take 81 mg by mouth daily. 12/3/22  Yes Provider, Historical   atorvastatin (LIPITOR) 40 mg tablet 40 mg by Feeding Tube route nightly. 12/2/22  Yes Provider, Historical   levothyroxine (SYNTHROID) 50 mcg tablet Take  by mouth Daily (before breakfast). Yes Provider, Historical   amoxicillin-clavulanate (AUGMENTIN) 875-125 mg per tablet Take  by mouth every twelve (12) hours. Patient not taking: Reported on 1/9/2023 1/9/23  Provider, Historical   cyanocobalamin 1,000 mcg tablet Take 1 Tablet by mouth daily. 9/28/22   Sandra Fairbanks MD   multivitamin (ONE A DAY) tablet Take 1 Tablet by mouth daily.   Patient not taking: Reported on 1/9/2023 9/28/22 Mitchell Flores MD     Patient Active Problem List   Diagnosis Code    Hypothyroidism E03.9    Stroke, recent, without late effect Z86.73    Vascular dementia with agitation F01.511    Chronic anemia D64.9    Thrombocytopenia (HCC) D69.6       ROS: See HPI    Objective:   No flowsheet data found. General: alert, cooperative, no distress   Mental  status: normal mood, behavior, speech, dress, motor activity, and thought processes, able to follow commands   HENT: NCAT   Neck: no visualized mass   Resp: no respiratory distress   Neuro: no gross deficits   Skin: no discoloration or lesions of concern on visible areas   Psychiatric: normal affect, consistent with stated mood, no evidence of hallucinations     Additional exam findings: We discussed the expected course, resolution and complications of the diagnosis(es) in detail. Medication risks, benefits, costs, interactions, and alternatives were discussed as indicated. I advised her to contact the office if her condition worsens, changes or fails to improve as anticipated. She expressed understanding with the diagnosis(es) and plan. Marium Sánchez, was evaluated through a synchronous (real-time) audio-video encounter. The patient (or guardian if applicable) is aware that this is a billable service, which includes applicable co-pays. This Virtual Visit was conducted with patient's (and/or legal guardian's) consent. The visit was conducted pursuant to the emergency declaration under the [address] [place] [address], [address] [place] [address] authority and the [employer] Act. Patient identification was verified, and a caregiver was present when appropriate. The patient was located at: Home: [address] [place] [address]  The provider was located at:  Facility (Appt Department): [address] [place] [address],Fourth Floor [phone]        Matthew Castellano MD

## 2023-01-09 NOTE — PROGRESS NOTES
1. \"Have you been to the ER, urgent care clinic since your last visit? Hospitalized since your last visit? \" Yes When: [facility] Admission 11/24/22 for hypothermia    2. \"Have you seen or consulted any other health care providers outside of the [address] [place] [address] since your last visit? \" No     3. For patients aged 39-70: Has the patient had a colonoscopy / FIT/ Cologuard? No      If the patient is female:    4. For patients aged 41-77: Has the patient had a mammogram within the past 2 years? No- Patient has an upcoming mammogram on 1/24/23. 5. For patients aged 21-65: Has the patient had a pap smear?  NA - based on age or sex

## 2023-01-10 ENCOUNTER — TELEPHONE (OUTPATIENT)
Dept: PHYSICAL THERAPY | Age: 68
End: 2023-01-10

## 2023-01-24 ENCOUNTER — HOSPITAL ENCOUNTER (OUTPATIENT)
Dept: MAMMOGRAPHY | Age: 68
Discharge: HOME | End: 2023-01-24
Attending: FAMILY MEDICINE
Payer: MEDICAID

## 2023-01-24 DIAGNOSIS — Z12.31 ENCOUNTER FOR SCREENING MAMMOGRAM FOR MALIGNANT NEOPLASM OF BREAST: ICD-10-CM

## 2023-01-24 PROCEDURE — 77067 SCR MAMMO BI INCL CAD: CPT

## 2023-01-26 NOTE — PROGRESS NOTES
Spoke with Oswald/JOLANTA he is aware that there are no abnormalities to mammogram and MD recommends to repeat exam in a year

## 2023-02-13 DIAGNOSIS — R13.10 DYSPHAGIA, UNSPECIFIED TYPE: Primary | ICD-10-CM

## 2023-10-03 PROBLEM — F03.918 DEMENTIA WITH BEHAVIORAL DISTURBANCE (HCC): Status: RESOLVED | Noted: 2022-09-28 | Resolved: 2023-01-09

## 2023-10-24 NOTE — PROGRESS NOTES
1. \"Have you been to the ER, urgent care clinic since your last visit? Hospitalized since your last visit? \" No    2. \"Have you seen or consulted any other health care providers outside of the [address] [place] [address] since your last visit? \" No     3. For patients aged 43-73: Has the patient had a colonoscopy / FIT/ Cologuard? No      If the patient is female:    4. For patients aged 43-66: Has the patient had a mammogram within the past 2 years? Yes - no Care Gap present      5. For patients aged 21-65: Has the patient had a pap smear?  NA - based on age or sex    Chief Complaint   Patient presents with    Dementia    Blood Sugar Problem    iron deficiency    Thyroid Problem    Dysphagia

## 2023-10-25 ENCOUNTER — OFFICE VISIT (OUTPATIENT)
Age: 68
End: 2023-10-25
Payer: MEDICAID

## 2023-10-25 VITALS
SYSTOLIC BLOOD PRESSURE: 138 MMHG | WEIGHT: 116 LBS | TEMPERATURE: 96.9 F | OXYGEN SATURATION: 97 % | RESPIRATION RATE: 16 BRPM | DIASTOLIC BLOOD PRESSURE: 88 MMHG | HEART RATE: 70 BPM

## 2023-10-25 DIAGNOSIS — R73.01 IMPAIRED FASTING BLOOD SUGAR: ICD-10-CM

## 2023-10-25 DIAGNOSIS — D69.6 THROMBOCYTOPENIA (HCC): ICD-10-CM

## 2023-10-25 DIAGNOSIS — E61.1 IRON DEFICIENCY: ICD-10-CM

## 2023-10-25 DIAGNOSIS — Z78.0 POSTMENOPAUSAL: ICD-10-CM

## 2023-10-25 DIAGNOSIS — R13.10 DYSPHAGIA, UNSPECIFIED TYPE: ICD-10-CM

## 2023-10-25 DIAGNOSIS — E78.5 DYSLIPIDEMIA: ICD-10-CM

## 2023-10-25 DIAGNOSIS — Z12.11 SCREENING FOR COLON CANCER: ICD-10-CM

## 2023-10-25 DIAGNOSIS — E03.9 HYPOTHYROIDISM, UNSPECIFIED TYPE: ICD-10-CM

## 2023-10-25 DIAGNOSIS — F01.A11 MILD VASCULAR DEMENTIA WITH AGITATION (HCC): ICD-10-CM

## 2023-10-25 DIAGNOSIS — R94.6 ABNORMAL THYROID FUNCTION TEST: ICD-10-CM

## 2023-10-25 DIAGNOSIS — Z23 NEED FOR VACCINATION: Primary | ICD-10-CM

## 2023-10-25 PROCEDURE — 1123F ACP DISCUSS/DSCN MKR DOCD: CPT | Performed by: FAMILY MEDICINE

## 2023-10-25 PROCEDURE — PBSHW INFLUENZA, FLUAD, (AGE 65 Y+), IM, PF, 0.5 ML: Performed by: FAMILY MEDICINE

## 2023-10-25 PROCEDURE — 90694 VACC AIIV4 NO PRSRV 0.5ML IM: CPT | Performed by: FAMILY MEDICINE

## 2023-10-25 PROCEDURE — 99214 OFFICE O/P EST MOD 30 MIN: CPT | Performed by: FAMILY MEDICINE

## 2023-10-25 RX ORDER — AMLODIPINE BESYLATE 2.5 MG/1
2.5 TABLET ORAL DAILY
Qty: 90 TABLET | Refills: 1 | Status: SHIPPED | OUTPATIENT
Start: 2023-10-25

## 2023-10-25 RX ORDER — ASPIRIN 81 MG/1
81 TABLET, CHEWABLE ORAL DAILY
Qty: 90 TABLET | Refills: 1 | Status: SHIPPED | OUTPATIENT
Start: 2023-10-25

## 2023-10-25 RX ORDER — LEVOTHYROXINE SODIUM 0.05 MG/1
50 TABLET ORAL
Qty: 90 TABLET | Refills: 1 | Status: SHIPPED | OUTPATIENT
Start: 2023-10-25

## 2023-10-25 RX ORDER — ATORVASTATIN CALCIUM 40 MG/1
40 TABLET, FILM COATED ORAL NIGHTLY
Qty: 90 TABLET | Refills: 1 | Status: SHIPPED | OUTPATIENT
Start: 2023-10-25

## 2023-10-25 SDOH — ECONOMIC STABILITY: INCOME INSECURITY: HOW HARD IS IT FOR YOU TO PAY FOR THE VERY BASICS LIKE FOOD, HOUSING, MEDICAL CARE, AND HEATING?: NOT HARD AT ALL

## 2023-10-25 SDOH — ECONOMIC STABILITY: FOOD INSECURITY: WITHIN THE PAST 12 MONTHS, YOU WORRIED THAT YOUR FOOD WOULD RUN OUT BEFORE YOU GOT MONEY TO BUY MORE.: NEVER TRUE

## 2023-10-25 SDOH — ECONOMIC STABILITY: HOUSING INSECURITY
IN THE LAST 12 MONTHS, WAS THERE A TIME WHEN YOU DID NOT HAVE A STEADY PLACE TO SLEEP OR SLEPT IN A SHELTER (INCLUDING NOW)?: NO

## 2023-10-25 SDOH — ECONOMIC STABILITY: FOOD INSECURITY: WITHIN THE PAST 12 MONTHS, THE FOOD YOU BOUGHT JUST DIDN'T LAST AND YOU DIDN'T HAVE MONEY TO GET MORE.: NEVER TRUE

## 2023-10-25 NOTE — PROGRESS NOTES
Linda Castle is a 77 y.o. female complains of   Chief Complaint   Patient presents with    Dementia    Blood Sugar Problem    iron deficiency    Thyroid Problem    Dysphagia       HPI: Here for follow-up. Accompanied with granddaughter. Dementia. No behavioral changes. Able to eat okay. Wearing depends. No concerns with urination or bowel movement. Sitting well dressed. Did not appear in any acute distress. Denies any depression or anxiety. No sundowning effect. She used to have dysphagia and went for speech therapy for some time. Currently no concerns. Able to swallow food okay. Reviewed prior labs. Iron deficiency, prediabetes, thrombocytopenia, hypothyroidism. Also history of hypertension. Not taken medication since last couple of months. Vitals been stable. We will restart medication. Stroke like symptoms. Starting again on statin, aspirin. We will restart Synthroid. If TSH abnormal we will repeat the labs in couple of months. Denies any headache, dizziness, no chest pain or trouble breathing, no arm or leg weakness. No nausea or vomiting, no weight or appetite changes, no mood changes . No urine or bowel complains, no palpitation, no diaphoresis. No abdominal pain. No cold or cough. No leg swelling. No fever. No sleep trouble.    CMP:  Lab Results   Component Value Date/Time     09/28/2022 12:00 AM    K 4.4 09/28/2022 12:00 AM     09/28/2022 12:00 AM    CO2 23 09/28/2022 12:00 AM    BUN 10 09/28/2022 12:00 AM    CREATININE 0.68 09/28/2022 12:00 AM    GLUCOSE 92 09/28/2022 12:00 AM    CALCIUM 9.1 09/28/2022 12:00 AM    PROT 6.9 01/09/2020 12:00 AM    LABALBU 3.7 01/09/2020 12:00 AM    BILITOT <0.2 01/09/2020 12:00 AM    AST 17 01/09/2020 12:00 AM    ALT 11 01/09/2020 12:00 AM          CBC:  Lab Results   Component Value Date/Time    WBC 5.2 01/09/2020 12:00 AM    RBC 4.64 01/09/2020 12:00 AM    HGB 12.9 01/09/2020 12:00 AM    HCT 40.1 01/09/2020 12:00 AM    MCV 86

## 2023-12-08 NOTE — PROGRESS NOTES
1. \"Have you been to the ER, urgent care clinic since your last visit? Hospitalized since your last visit? \" {Yes when where and reason for visit:20441}    2. \"Have you seen or consulted any other health care providers outside of the [address] [place] [address] since your last visit? \" {Yes when where and reason for visit:20441}     3. For patients aged 39-70: Has the patient had a colonoscopy / FIT/ Cologuard? No      If the patient is female:    4. For patients aged 41-77: Has the patient had a mammogram within the past 2 years? No      5. For patients aged 21-65: Has the patient had a pap smear?  No no

## 2024-01-01 NOTE — PROGRESS NOTES
Patient presents for the following vaccines: FLUAD. Patient consent obtained by patient. Patient tolerated procedure well at left deltoid. Patient advised to remain in lobby for period of 10 minutes post injection to ensure no reaction. VIS was given to patient.     Lot # [id number]  Exp: 6/30/24  MFG: [employer]  [address]: [id number] This note was copied from the mother's chart.     07/21/24 8645   Maternal Assessment   Breast Shape Bilateral:;round   Breast Density Bilateral:;soft   Maternal Infant Feeding   Maternal Preparation breast care;hand hygiene   Maternal Emotional State relaxed   Latch Assistance no   Community Referrals   Community Referrals outpatient lactation program     LC to room: follow up visit, intro self, client SO and family in room. Feedings reviewed, client reported that infant began cluster feeding early this morning, education provided on cluster feeds, parents v/u. Reviewed feeding on cue 8 or more in 24 hours, extension to call PRN assist/questions, all questions answered at this time, v/u how to call, MBU RN updated.

## 2024-05-08 RX ORDER — ATORVASTATIN CALCIUM 40 MG/1
40 TABLET, FILM COATED ORAL NIGHTLY
Qty: 90 TABLET | Refills: 1 | Status: SHIPPED | OUTPATIENT
Start: 2024-05-08

## 2024-06-06 RX ORDER — AMLODIPINE BESYLATE 2.5 MG/1
2.5 TABLET ORAL DAILY
Qty: 30 TABLET | OUTPATIENT
Start: 2024-06-06

## 2024-06-06 RX ORDER — ASPIRIN 81 MG
TABLET,CHEWABLE ORAL
Qty: 30 TABLET | OUTPATIENT
Start: 2024-06-06

## 2024-06-06 RX ORDER — LEVOTHYROXINE SODIUM 0.05 MG/1
50 TABLET ORAL
Qty: 30 TABLET | OUTPATIENT
Start: 2024-06-06

## 2024-06-17 ENCOUNTER — TELEPHONE (OUTPATIENT)
Facility: CLINIC | Age: 69
End: 2024-06-17

## 2024-06-17 RX ORDER — ASPIRIN 81 MG/1
81 TABLET, CHEWABLE ORAL DAILY
Qty: 90 TABLET | Refills: 1 | Status: SHIPPED | OUTPATIENT
Start: 2024-06-17

## 2024-06-17 RX ORDER — LEVOTHYROXINE SODIUM 0.05 MG/1
50 TABLET ORAL
Qty: 90 TABLET | Refills: 1 | Status: SHIPPED | OUTPATIENT
Start: 2024-06-17

## 2024-06-17 RX ORDER — AMLODIPINE BESYLATE 2.5 MG/1
2.5 TABLET ORAL DAILY
Qty: 90 TABLET | Refills: 1 | Status: SHIPPED | OUTPATIENT
Start: 2024-06-17

## 2024-06-17 RX ORDER — ATORVASTATIN CALCIUM 40 MG/1
40 TABLET, FILM COATED ORAL NIGHTLY
Qty: 90 TABLET | Refills: 1 | Status: SHIPPED | OUTPATIENT
Start: 2024-06-17

## 2024-06-17 NOTE — TELEPHONE ENCOUNTER
This pharmacy faxed over request for the following prescriptions to be filled:    Medication requested :   amLODIPine Besylate 2.5 mg Oral DAILY QTY 90    Levothyroxine Sodium 50 mcg Oral DAILY BEFORE BREAKFAST QTY 90  Aspirin 81 MG CHEW 1 TABLET BY MOUTH DAILY QTY 90       PCP: Betty  Pharmacy or Print: [facility]  Mail order or Local pharmacy [id number]     Scheduled appointment if not seen by current providers in office: LOV 10/25/2023 Ray 7/11/2024

## 2024-07-11 ENCOUNTER — OFFICE VISIT (OUTPATIENT)
Facility: CLINIC | Age: 69
End: 2024-07-11
Payer: MEDICAID

## 2024-07-11 VITALS
HEART RATE: 76 BPM | RESPIRATION RATE: 16 BRPM | WEIGHT: 111 LBS | OXYGEN SATURATION: 97 % | TEMPERATURE: 97.3 F | SYSTOLIC BLOOD PRESSURE: 130 MMHG | DIASTOLIC BLOOD PRESSURE: 86 MMHG

## 2024-07-11 DIAGNOSIS — F01.A11 MILD VASCULAR DEMENTIA WITH AGITATION (HCC): Primary | ICD-10-CM

## 2024-07-11 DIAGNOSIS — R39.81 FUNCTIONAL URINARY INCONTINENCE: ICD-10-CM

## 2024-07-11 DIAGNOSIS — E53.8 B12 DEFICIENCY: ICD-10-CM

## 2024-07-11 DIAGNOSIS — D69.6 THROMBOCYTOPENIA (HCC): ICD-10-CM

## 2024-07-11 DIAGNOSIS — D64.9 CHRONIC ANEMIA: ICD-10-CM

## 2024-07-11 DIAGNOSIS — R73.01 IMPAIRED FASTING BLOOD SUGAR: ICD-10-CM

## 2024-07-11 DIAGNOSIS — Z86.19: ICD-10-CM

## 2024-07-11 DIAGNOSIS — Z86.73 STROKE, RECENT, WITHOUT LATE EFFECT: ICD-10-CM

## 2024-07-11 DIAGNOSIS — E03.9 HYPOTHYROIDISM, UNSPECIFIED TYPE: ICD-10-CM

## 2024-07-11 PROCEDURE — 99214 OFFICE O/P EST MOD 30 MIN: CPT | Performed by: FAMILY MEDICINE

## 2024-07-11 PROCEDURE — 1123F ACP DISCUSS/DSCN MKR DOCD: CPT | Performed by: FAMILY MEDICINE

## 2024-07-11 RX ORDER — ATORVASTATIN CALCIUM 40 MG/1
40 TABLET, FILM COATED ORAL NIGHTLY
Qty: 90 TABLET | Refills: 1 | Status: SHIPPED | OUTPATIENT
Start: 2024-07-11

## 2024-07-11 RX ORDER — ASPIRIN 81 MG/1
81 TABLET, CHEWABLE ORAL DAILY
Qty: 90 TABLET | Refills: 1 | Status: SHIPPED | OUTPATIENT
Start: 2024-07-11

## 2024-07-11 RX ORDER — LEVOTHYROXINE SODIUM 0.05 MG/1
50 TABLET ORAL
Qty: 90 TABLET | Refills: 1 | Status: SHIPPED | OUTPATIENT
Start: 2024-07-11

## 2024-07-11 RX ORDER — AMLODIPINE BESYLATE 2.5 MG/1
2.5 TABLET ORAL DAILY
Qty: 90 TABLET | Refills: 1 | Status: SHIPPED | OUTPATIENT
Start: 2024-07-11

## 2024-07-11 SDOH — ECONOMIC STABILITY: FOOD INSECURITY: WITHIN THE PAST 12 MONTHS, YOU WORRIED THAT YOUR FOOD WOULD RUN OUT BEFORE YOU GOT MONEY TO BUY MORE.: NEVER TRUE

## 2024-07-11 SDOH — ECONOMIC STABILITY: FOOD INSECURITY: WITHIN THE PAST 12 MONTHS, THE FOOD YOU BOUGHT JUST DIDN'T LAST AND YOU DIDN'T HAVE MONEY TO GET MORE.: NEVER TRUE

## 2024-07-11 SDOH — ECONOMIC STABILITY: INCOME INSECURITY: HOW HARD IS IT FOR YOU TO PAY FOR THE VERY BASICS LIKE FOOD, HOUSING, MEDICAL CARE, AND HEATING?: NOT HARD AT ALL

## 2024-07-11 ASSESSMENT — PATIENT HEALTH QUESTIONNAIRE - PHQ9
2. FEELING DOWN, DEPRESSED OR HOPELESS: NOT AT ALL
SUM OF ALL RESPONSES TO PHQ QUESTIONS 1-9: 0
SUM OF ALL RESPONSES TO PHQ9 QUESTIONS 1 & 2: 0
SUM OF ALL RESPONSES TO PHQ QUESTIONS 1-9: 0
1. LITTLE INTEREST OR PLEASURE IN DOING THINGS: NOT AT ALL

## 2024-07-11 NOTE — PROGRESS NOTES
Alis Mancia (:  1955) is a 69 y.o. female, Established patient, here for evaluation of the following chief complaint(s):  Blood Sugar Problem, Thyroid Problem, Cholesterol Problem, and Orders (Wants Rx for Ensure)         Assessment & Plan  1. Routine follow-up.  Her blood pressure readings are within normal range. She is overdue for blood work, including thyroid and other blood work. She is currently on cholesterol medication and a history of chronic anemia. She has a history of low platelet count and has been on a B12 supplement. Her mental health is stable due to neurosyphilis. Her A1c test conducted a year ago indicated prediabetes. She will continue with her current blood pressure medication. Her thyroid medications will be refilled without checking them. Liver function and kidney function tests will be conducted. Iron levels will be checked. Platelet count and B12 level will be checked. Her A1c will be rechecked. A handout on diet will be provided. A prescription for diapers will be provided. A referral to a neurologist will be arranged.         Nephew requested ensure. Will rosalba the paper as her appetite is being low.   Once a day.   Results    1. Mild vascular dementia with agitation (HCC): has not seen neurology. Per nephew whom she lives with stated no change in status. Done referral again as she has not seen them yet. Advised to call the office if do not hear from specialist in couple of weeks. Advised to  complete labs. Discussed compliance with instructions.   -     Elizabet Cortez MD, Neurology, [place]  2. Hypothyroidism, unspecified type: recheck labs. Given medication refill.   -     TSH; Future  3. Stroke, recent, without late effect: always keeps her fist close and tight. No other neurological deficit noted. Will observe. Risk factor management.   -     Comprehensive Metabolic Panel; Future  -     Lipid Panel; Future  -     [facility] - Elizabet Schultz MD, Neurology,

## 2024-07-11 NOTE — PROGRESS NOTES
\"Have you been to the ER, urgent care clinic since your last visit?  Hospitalized since your last visit?\"    NO    “Have you seen or consulted any other health care providers outside of [facility] since your last visit?”    NO        “Have you had a colorectal cancer screening such as a colonoscopy/FIT/Cologuard?    NO    No colonoscopy on file  No cologuard on file  No FIT/FOBT on file   No flexible sigmoidoscopy on file     Chief Complaint   Patient presents with    Blood Sugar Problem    Thyroid Problem    Cholesterol Problem    Orders     Wants Rx for Ensure         Click Here for Release of Records Request

## 2024-07-25 ENCOUNTER — TELEPHONE (OUTPATIENT)
Facility: CLINIC | Age: 69
End: 2024-07-25

## 2024-08-05 ENCOUNTER — TELEPHONE (OUTPATIENT)
Facility: CLINIC | Age: 69
End: 2024-08-05

## 2024-08-05 RX ORDER — AMLODIPINE BESYLATE 2.5 MG/1
2.5 TABLET ORAL DAILY
Qty: 90 TABLET | Refills: 1 | Status: SHIPPED | OUTPATIENT
Start: 2024-08-05

## 2024-08-05 RX ORDER — ATORVASTATIN CALCIUM 40 MG/1
40 TABLET, FILM COATED ORAL NIGHTLY
Qty: 90 TABLET | Refills: 1 | Status: SHIPPED | OUTPATIENT
Start: 2024-08-05

## 2024-08-05 RX ORDER — LEVOTHYROXINE SODIUM 0.05 MG/1
50 TABLET ORAL
Qty: 90 TABLET | Refills: 1 | Status: SHIPPED | OUTPATIENT
Start: 2024-08-05

## 2024-08-05 RX ORDER — ASPIRIN 81 MG/1
81 TABLET, CHEWABLE ORAL DAILY
Qty: 90 TABLET | Refills: 1 | Status: SHIPPED | OUTPATIENT
Start: 2024-08-05

## 2024-08-05 NOTE — TELEPHONE ENCOUNTER
Pt uses [facility] can this be sent today. It was sent to [facility].     amLODIPine Besylate 2.5 mg Oral DAILY    Aspirin 81 mg Oral DAILY    Atorvastatin Calcium 40 mg Oral Nightly    Cyanocobalamin 1,000 mcg Oral DAILY    Levothyroxine Sodium 50 mcg Oral DAILY BEFORE BREAKFAST

## 2024-09-03 ENCOUNTER — TELEPHONE (OUTPATIENT)
Facility: CLINIC | Age: 69
End: 2024-09-03

## 2024-09-03 NOTE — TELEPHONE ENCOUNTER
Add the ICD 10 Code for the new diagnostic code that was given and check the date for on set..  Certificate of medical of necessity..

## 2024-09-05 LAB — SPECIMEN STATUS REPORT: NORMAL

## 2024-09-05 NOTE — TELEPHONE ENCOUNTER
The CMN from [facility] dated 7/25/24 has been reprinted. ICD-10 Code for malnutrition (E46) has been added to form. Form has been placed on Dr. Hernández's desk so provider can advise of \"Date of Onset\". Will fax form back to [facility] once it has been reviewed/completed by Dr. Hernández.

## 2024-09-10 NOTE — TELEPHONE ENCOUNTER
Left a message asking Negro Myers (HIPAA verified) to return call to [facility] regarding orders for patient, at [phone] press option # 3 for . Ask to speak with Sandra.     Patient needs to complete blood work so Dr. Hernández can complete the nutrition supplement form from [facility]. I will mail lab order to patient's address and advise to complete as soon as possible, as well.    As FYI, the [facility] specimen that was collected on 9/03/24 was for the FIT test. However, [facility] was unable to complete the test because there was no patient identification on container. Test was canceled. Patient will need send another FIT test specimen.

## 2024-12-28 RX ORDER — ATORVASTATIN CALCIUM 40 MG/1
40 TABLET, FILM COATED ORAL NIGHTLY
Qty: 30 TABLET | Refills: 0 | Status: SHIPPED | OUTPATIENT
Start: 2024-12-28

## 2025-03-17 RX ORDER — ASPIRIN 81 MG
TABLET,CHEWABLE ORAL
Qty: 30 TABLET | OUTPATIENT
Start: 2025-03-17

## 2025-03-17 RX ORDER — AMLODIPINE BESYLATE 2.5 MG/1
2.5 TABLET ORAL DAILY
Qty: 90 TABLET | Refills: 1 | OUTPATIENT
Start: 2025-03-17

## 2025-03-17 RX ORDER — LEVOTHYROXINE SODIUM 50 UG/1
50 TABLET ORAL
Qty: 90 TABLET | Refills: 1 | OUTPATIENT
Start: 2025-03-17

## 2025-03-19 ENCOUNTER — TELEPHONE (OUTPATIENT)
Facility: CLINIC | Age: 70
End: 2025-03-19

## 2025-03-19 NOTE — TELEPHONE ENCOUNTER
Incoming call received from [facility] faxed over a form on 03/13/2025 and they added gloves to the order and wants to know the status of the form? Fax # [phone].

## 2025-04-01 ENCOUNTER — TELEPHONE (OUTPATIENT)
Facility: CLINIC | Age: 70
End: 2025-04-01

## 2025-04-01 NOTE — TELEPHONE ENCOUNTER
Incoming call received from [facility] they faxed over a certificate of medical supplies and they faxed over the wrong form and they going to fax over the new right form.

## 2025-04-07 RX ORDER — LEVOTHYROXINE SODIUM 50 UG/1
50 TABLET ORAL
Qty: 90 TABLET | Refills: 1 | OUTPATIENT
Start: 2025-04-07

## 2025-04-07 RX ORDER — ATORVASTATIN CALCIUM 40 MG/1
40 TABLET, FILM COATED ORAL NIGHTLY
Qty: 90 TABLET | Refills: 1 | OUTPATIENT
Start: 2025-04-07

## 2025-04-07 RX ORDER — AMLODIPINE BESYLATE 2.5 MG/1
2.5 TABLET ORAL DAILY
Qty: 90 TABLET | Refills: 1 | OUTPATIENT
Start: 2025-04-07

## 2025-04-07 RX ORDER — ASPIRIN 81 MG
TABLET,CHEWABLE ORAL
Qty: 90 TABLET | Refills: 1 | OUTPATIENT
Start: 2025-04-07

## 2025-04-25 RX ORDER — ASPIRIN 81 MG
TABLET,CHEWABLE ORAL
Qty: 90 TABLET | Refills: 1 | Status: SHIPPED | OUTPATIENT
Start: 2025-04-25

## 2025-04-25 RX ORDER — ATORVASTATIN CALCIUM 40 MG/1
40 TABLET, FILM COATED ORAL NIGHTLY
Qty: 90 TABLET | Refills: 1 | Status: SHIPPED | OUTPATIENT
Start: 2025-04-25

## 2025-04-25 RX ORDER — AMLODIPINE BESYLATE 2.5 MG/1
2.5 TABLET ORAL DAILY
Qty: 90 TABLET | Refills: 1 | Status: SHIPPED | OUTPATIENT
Start: 2025-04-25

## 2025-04-25 RX ORDER — LEVOTHYROXINE SODIUM 50 UG/1
50 TABLET ORAL
Qty: 90 TABLET | Refills: 1 | Status: SHIPPED | OUTPATIENT
Start: 2025-04-25

## 2025-04-25 NOTE — TELEPHONE ENCOUNTER
Granddaughter called in reference to refill request on 4/23/2025   Requested Prescriptions     Pending Prescriptions Disp Refills    amLODIPine (NORVASC) 2.5 MG tablet [Pharmacy Med Name: AMLODIPINE BESYLATE 2.5 MG TAB] 90 tablet 1     Sig: TAKE 1 TABLET BY MOUTH DAILY    levothyroxine (SYNTHROID) 50 MCG tablet [Pharmacy Med Name: LEVOTHYROXINE 50 MCG TABLET] 90 tablet 1     Sig: TAKE ONE TABLET BY MOUTH EVERY MORNING BEFORE BREAKFAST    atorvastatin (LIPITOR) 40 MG tablet [Pharmacy Med Name: ATORVASTATIN 40 MG TABLET] 30 tablet 0     Sig: TAKE 1 TABLET BY MOUTH AT BEDTIME    ASPIRIN LOW DOSE 81 MG chewable tablet [Pharmacy Med Name: ASPIRIN 81 MG CHEWABLE TABLET] 30 tablet 3     Sig: CHEW 1 TABLET BY MOUTH DAILY    Pt has an upcoming appt on 5/6/2025. Granddaughter will make sure she comes. Please advise if medications will be filled at the [facility]

## 2025-05-02 ENCOUNTER — TELEPHONE (OUTPATIENT)
Facility: CLINIC | Age: 70
End: 2025-05-02

## 2025-05-05 NOTE — TELEPHONE ENCOUNTER
Patient has appointment with Dr. Hernández on 5/06/2025. [facility] form can be reviewed at visit. A new [facility] form has been received requesting signature for \"EF Calorie Dense\" which is different than the [facility] forms that were recently signed and viewable in media.

## 2025-05-06 ENCOUNTER — OFFICE VISIT (OUTPATIENT)
Facility: CLINIC | Age: 70
End: 2025-05-06
Payer: MEDICAID

## 2025-05-06 VITALS
BODY MASS INDEX: 18.02 KG/M2 | HEIGHT: 60 IN | HEART RATE: 88 BPM | RESPIRATION RATE: 16 BRPM | DIASTOLIC BLOOD PRESSURE: 76 MMHG | WEIGHT: 91.8 LBS | OXYGEN SATURATION: 99 % | SYSTOLIC BLOOD PRESSURE: 110 MMHG

## 2025-05-06 DIAGNOSIS — Z12.31 ENCOUNTER FOR SCREENING MAMMOGRAM FOR MALIGNANT NEOPLASM OF BREAST: ICD-10-CM

## 2025-05-06 DIAGNOSIS — F01.B11 MODERATE VASCULAR DEMENTIA WITH AGITATION (HCC): Primary | ICD-10-CM

## 2025-05-06 DIAGNOSIS — Z86.73 H/O: STROKE: ICD-10-CM

## 2025-05-06 DIAGNOSIS — Z12.11 SCREENING FOR COLON CANCER: ICD-10-CM

## 2025-05-06 DIAGNOSIS — Z78.0 POST-MENOPAUSAL: ICD-10-CM

## 2025-05-06 PROCEDURE — 99214 OFFICE O/P EST MOD 30 MIN: CPT | Performed by: FAMILY MEDICINE

## 2025-05-06 PROCEDURE — 1123F ACP DISCUSS/DSCN MKR DOCD: CPT | Performed by: FAMILY MEDICINE

## 2025-05-06 RX ORDER — ASPIRIN 81 MG/1
81 TABLET, CHEWABLE ORAL DAILY
Qty: 90 TABLET | Refills: 1 | Status: SHIPPED | OUTPATIENT
Start: 2025-05-06

## 2025-05-06 RX ORDER — ATORVASTATIN CALCIUM 40 MG/1
40 TABLET, FILM COATED ORAL NIGHTLY
Qty: 90 TABLET | Refills: 1 | Status: SHIPPED | OUTPATIENT
Start: 2025-05-06

## 2025-05-06 RX ORDER — LEVOTHYROXINE SODIUM 50 UG/1
50 TABLET ORAL
Qty: 90 TABLET | Refills: 1 | Status: SHIPPED | OUTPATIENT
Start: 2025-05-06

## 2025-05-06 RX ORDER — AMLODIPINE BESYLATE 2.5 MG/1
2.5 TABLET ORAL DAILY
Qty: 90 TABLET | Refills: 1 | Status: SHIPPED | OUTPATIENT
Start: 2025-05-06

## 2025-05-06 SDOH — ECONOMIC STABILITY: FOOD INSECURITY: WITHIN THE PAST 12 MONTHS, YOU WORRIED THAT YOUR FOOD WOULD RUN OUT BEFORE YOU GOT MONEY TO BUY MORE.: NEVER TRUE

## 2025-05-06 SDOH — ECONOMIC STABILITY: FOOD INSECURITY: WITHIN THE PAST 12 MONTHS, THE FOOD YOU BOUGHT JUST DIDN'T LAST AND YOU DIDN'T HAVE MONEY TO GET MORE.: NEVER TRUE

## 2025-05-06 ASSESSMENT — PATIENT HEALTH QUESTIONNAIRE - PHQ9: DEPRESSION UNABLE TO ASSESS: FUNCTIONAL CAPACITY MOTIVATION LIMITS ACCURACY

## 2025-05-06 NOTE — PROGRESS NOTES
Have you been to the ER, urgent care clinic since your last visit?  Hospitalized since your last visit?   NO    Have you seen or consulted any other health care providers outside our system since your last visit?   NO    Have you had a mammogram?”   NO    Date of last Mammogram: 1/24/2023       “Have you had a colorectal cancer screening such as a colonoscopy/FIT/Cologuard?    NO    No colonoscopy on file  No cologuard on file  No FIT/FOBT on file   No flexible sigmoidoscopy on file          Chief Complaint   Patient presents with    Blood Sugar Problem    Thyroid Problem    Dementia    Thrombocytopenia    Needs [facility] form completed    Orders     Needs order for wheel chair and feeding table to go over the bed    Medication Check     Requests prescription for multivitamin - patient is not eating well. Family has to hand feed patient. Can use [facility] [id number]. [phone] (patient had this sticker on hospital bed, so can try to use this same company for additional equipment request.

## 2025-05-06 NOTE — TELEPHONE ENCOUNTER
Shira Hernández MD  You4 hours ago (2:45 PM)      Completed during visit        Noted. Completed form has been faxed back to [facility]. A fax confirmation has been received.

## 2025-05-06 NOTE — PROGRESS NOTES
Alis Mancia (:  1955) is a 70 y.o. female, Established patient, here for evaluation of the following chief complaint(s):  Blood Sugar Problem, Thyroid Problem, Dementia, Thrombocytopenia, Needs [facility] form completed, Orders (Needs order for wheel chair and feeding table to go over the bed), and Medication Check (Requests prescription for multivitamin - patient is not eating well. Family has to hand feed patient. Can use [employer]. [employer]. [phone] (patient had this sticker on hospital bed, so can try to use this same company for additional equipment request.)         Assessment & Plan  1. Dementia.  - Dementia appears to be progressing, necessitating further evaluation by a neurologist.  - Neurology referral was made during the last visit in 2024, but she has not yet seen the neurologist.  - Advised to schedule an appointment with the neurologist to assess the progression of dementia. If there are any difficulties in scheduling, she should inform us so we can assist.  - No complaints of headache, dizziness, nausea, vomiting, or behavior changes.    2. Dysphagia.  - Having trouble swallowing and is given softer foods.  - No prior speech evaluation confirmed.  - May need a speech evaluation to assess and manage swallowing difficulties.  - No focal neurological deficit noted during the physical exam.    3. Incontinence.  - Experiences both bowel and urinary incontinence and uses pull-ups.  - No bed sores reported.  - No acute distress noted during the physical exam.    4. Health Maintenance.  - Due for pneumonia, shingles, tetanus, and COVID booster vaccines, which can be administered at the pharmacy.  - Bone density test and mammogram will be ordered.  - Stool card will be provided for colon cancer screening, which can be completed at home and submitted to the lab on the same day as blood work.  - Fasting blood work to be conducted within the week.    5.

## 2025-05-19 ENCOUNTER — TELEPHONE (OUTPATIENT)
Facility: CLINIC | Age: 70
End: 2025-05-19

## 2025-05-19 NOTE — TELEPHONE ENCOUNTER
Pt's daughter is requesting a letter for [employer] stating that pt can't come in to the [facility] to handle affairs due to her Dementia . has POA on file and will need to take the letter too. Please advise. When able to  letter. Thank you

## 2025-05-21 LAB — HEMOCCULT STL QL IA: NEGATIVE

## 2025-05-22 ENCOUNTER — RESULTS FOLLOW-UP (OUTPATIENT)
Facility: CLINIC | Age: 70
End: 2025-05-22

## 2025-07-21 DIAGNOSIS — F01.A11 MILD VASCULAR DEMENTIA WITH AGITATION (HCC): Primary | ICD-10-CM

## 2025-07-21 DIAGNOSIS — Z86.73 STROKE, RECENT, WITHOUT LATE EFFECT: ICD-10-CM

## 2025-07-28 ENCOUNTER — TELEPHONE (OUTPATIENT)
Facility: CLINIC | Age: 70
End: 2025-07-28

## 2025-08-21 ENCOUNTER — TELEPHONE (OUTPATIENT)
Facility: CLINIC | Age: 70
End: 2025-08-21

## 2025-08-22 ENCOUNTER — TELEPHONE (OUTPATIENT)
Facility: CLINIC | Age: 70
End: 2025-08-22